# Patient Record
Sex: MALE | Race: WHITE | NOT HISPANIC OR LATINO | Employment: FULL TIME | URBAN - NONMETROPOLITAN AREA
[De-identification: names, ages, dates, MRNs, and addresses within clinical notes are randomized per-mention and may not be internally consistent; named-entity substitution may affect disease eponyms.]

---

## 2022-05-04 ENCOUNTER — TELEPHONE (OUTPATIENT)
Dept: FAMILY MEDICINE CLINIC | Facility: CLINIC | Age: 32
End: 2022-05-04

## 2022-05-04 NOTE — TELEPHONE ENCOUNTER
Incoming Refill Request      Medication requested (name and dose):     ATORVASTATIN 20MG TABLET    [facility]    Additional details provided by patient:     NONE    Best call back number:     [phone]    Does the patient have less than a 3 day supply:  [] Yes  [x] No    Iram Benítez  05/04/22, 14:17 EDT

## 2022-05-06 RX ORDER — ATORVASTATIN CALCIUM 20 MG/1
TABLET, FILM COATED ORAL
COMMUNITY
Start: 2022-03-31 | End: 2022-05-06 | Stop reason: SDUPTHER

## 2022-05-06 RX ORDER — OMEPRAZOLE 40 MG/1
40 CAPSULE, DELAYED RELEASE ORAL DAILY
COMMUNITY
Start: 2022-02-16 | End: 2022-08-09 | Stop reason: SDUPTHER

## 2022-05-06 RX ORDER — PAROXETINE 10 MG/1
10 TABLET, FILM COATED ORAL EVERY MORNING
COMMUNITY
Start: 2022-02-16 | End: 2022-08-09 | Stop reason: SDUPTHER

## 2022-05-06 RX ORDER — ATORVASTATIN CALCIUM 20 MG/1
20 TABLET, FILM COATED ORAL DAILY
Qty: 90 TABLET | Refills: 1 | Status: SHIPPED | OUTPATIENT
Start: 2022-05-06 | End: 2022-11-03

## 2022-05-06 RX ORDER — RAMIPRIL 5 MG/1
5 CAPSULE ORAL DAILY
COMMUNITY
Start: 2022-02-16 | End: 2022-08-09 | Stop reason: SDUPTHER

## 2022-08-09 ENCOUNTER — OFFICE VISIT (OUTPATIENT)
Dept: FAMILY MEDICINE CLINIC | Facility: CLINIC | Age: 32
End: 2022-08-09

## 2022-08-09 VITALS
DIASTOLIC BLOOD PRESSURE: 82 MMHG | SYSTOLIC BLOOD PRESSURE: 130 MMHG | BODY MASS INDEX: 25.97 KG/M2 | HEIGHT: 70 IN | OXYGEN SATURATION: 96 % | TEMPERATURE: 97.1 F | HEART RATE: 78 BPM | WEIGHT: 181.4 LBS

## 2022-08-09 DIAGNOSIS — Z00.00 ENCOUNTER FOR WELLNESS EXAMINATION IN ADULT: Primary | ICD-10-CM

## 2022-08-09 DIAGNOSIS — E78.2 MIXED HYPERLIPIDEMIA: ICD-10-CM

## 2022-08-09 DIAGNOSIS — E66.3 OVERWEIGHT: ICD-10-CM

## 2022-08-09 DIAGNOSIS — F41.1 GAD (GENERALIZED ANXIETY DISORDER): ICD-10-CM

## 2022-08-09 DIAGNOSIS — I10 PRIMARY HYPERTENSION: ICD-10-CM

## 2022-08-09 DIAGNOSIS — K21.9 GASTROESOPHAGEAL REFLUX DISEASE, UNSPECIFIED WHETHER ESOPHAGITIS PRESENT: ICD-10-CM

## 2022-08-09 PROCEDURE — 99385 PREV VISIT NEW AGE 18-39: CPT | Performed by: FAMILY MEDICINE

## 2022-08-09 PROCEDURE — 36415 COLL VENOUS BLD VENIPUNCTURE: CPT | Performed by: FAMILY MEDICINE

## 2022-08-09 RX ORDER — OMEPRAZOLE 40 MG/1
40 CAPSULE, DELAYED RELEASE ORAL DAILY
Qty: 90 CAPSULE | Refills: 1 | Status: SHIPPED | OUTPATIENT
Start: 2022-08-09

## 2022-08-09 RX ORDER — RAMIPRIL 5 MG/1
5 CAPSULE ORAL DAILY
Qty: 90 CAPSULE | Refills: 1 | Status: SHIPPED | OUTPATIENT
Start: 2022-08-09

## 2022-08-09 RX ORDER — PAROXETINE 10 MG/1
10 TABLET, FILM COATED ORAL EVERY MORNING
Qty: 90 TABLET | Refills: 1 | Status: SHIPPED | OUTPATIENT
Start: 2022-08-09

## 2022-08-09 NOTE — PROGRESS NOTES
Patient Name: Andres Corral  : 1990   MRN: [id number]     Chief Complaint:    Chief Complaint   Patient presents with   • Annual Exam       History of Present Illness: Andres Corral is a 32 y.o. male who is here today for their annual health maintenance and physical. Patient presents for follow-up on chronic medical problems including hypertension, hyperlipidemia and GERD. Patient denies adverse effects of medications, headache, dizziness, chest pain, palpitations, shortness of breath and cough. Patient complains of no significant issue. Patient is here for monitoring of chronic issues and fasting lab work.  He has family history of father with type 2 diabetes.  He denies family history of colon or prostate cancer.            Review of Systems:   Review of Systems   Constitutional: Negative for fatigue.   Eyes: Negative for blurred vision.   Respiratory: Negative for cough, chest tightness and shortness of breath.    Cardiovascular: Negative for chest pain, palpitations and leg swelling.   Gastrointestinal: Negative for abdominal pain, constipation, diarrhea, nausea and vomiting.   Neurological: Negative for dizziness, tremors and headache.   Psychiatric/Behavioral: Negative for depressed mood. The patient is not nervous/anxious.        Past Medical History, Social History, Family History and Care Team were all reviewed with patient and updated as appropriate.     Medications:     Current Outpatient Medications:   •  atorvastatin (LIPITOR) 20 MG tablet, Take 1 tablet by mouth Daily., Disp: 90 tablet, Rfl: 1  •  omeprazole (priLOSEC) 40 MG capsule, Take 1 capsule by mouth Daily., Disp: 90 capsule, Rfl: 1  •  PARoxetine (PAXIL) 10 MG tablet, Take 1 tablet by mouth Every Morning., Disp: 90 tablet, Rfl: 1  •  ramipril (ALTACE) 5 MG capsule, Take 1 capsule by mouth Daily., Disp: 90 capsule, Rfl: 1    Allergies:   No Known Allergies        PHQ-2 Total Score: 0   PHQ-9 Total Score: 0      Intimate partner  "violence: (Screen on initial visit, older adult with injury or evidence of neglect):  • Violence can be a problem in many people's lives, so I now ask every patient about trauma or abuse they may have experienced in a relationship.  • Stress/Safety - Do you feel safe in your relationship?  • Afraid/Abused - Have you ever been in a relationship where you were threatened, hurt, or afraid?  • Friend/Family - Are your friends aware you have been hurt?  • Emergency Plan - Do you have a safe place to go and the resources you need in an emergency?    Osteoporosis:   • Men: history of low trauma fracture, androgen deprivation therapy for prostate cancer, hypogonadism, primary hyperparathyroidism, intestinal disorders.       Physical Exam:  Vital Signs:   Vitals:    08/09/22 0752 08/09/22 0810   BP: 142/100 130/82   BP Location: Left arm    Patient Position: Sitting    Cuff Size: Adult    Pulse: 78    Temp: 97.1 °F (36.2 °C)    TempSrc: Temporal    SpO2: 96%    Weight: 82.3 kg (181 lb 6.4 oz)    Height: 177.8 cm (70\")      Body mass index is 26.03 kg/m².     Physical Exam  Vitals and nursing note reviewed.   Constitutional:       Appearance: Normal appearance.   HENT:      Head: Normocephalic and atraumatic.      Right Ear: Tympanic membrane and ear canal normal.      Left Ear: Tympanic membrane and ear canal normal.      Nose: Nose normal.      Mouth/Throat:      Mouth: Mucous membranes are moist.      Pharynx: Oropharynx is clear.   Eyes:      Conjunctiva/sclera: Conjunctivae normal.      Pupils: Pupils are equal, round, and reactive to light.   Cardiovascular:      Rate and Rhythm: Normal rate and regular rhythm.      Heart sounds: Normal heart sounds. No murmur heard.  Pulmonary:      Effort: Pulmonary effort is normal.      Breath sounds: Normal breath sounds. No wheezing, rhonchi or rales.   Abdominal:      General: Bowel sounds are normal.      Palpations: Abdomen is soft.      Tenderness: There is no abdominal " tenderness.   Musculoskeletal:         General: Normal range of motion.      Cervical back: Normal range of motion and neck supple.      Right lower leg: No edema.      Left lower leg: No edema.   Lymphadenopathy:      Cervical: No cervical adenopathy.   Skin:     General: Skin is warm.      Findings: No rash.   Neurological:      General: No focal deficit present.      Mental Status: He is alert and oriented to person, place, and time.      Motor: No weakness.   Psychiatric:         Mood and Affect: Mood normal.         Behavior: Behavior normal.         Procedures      Assessment/Plan:   Diagnoses and all orders for this visit:    1. Encounter for wellness examination in adult (Primary)  Assessment & Plan:  Fasting labs today, patient is up-to-date on health maintenance    Orders:  -     Hemoglobin A1c; Future  -     CBC Auto Differential; Future  -     Comprehensive Metabolic Panel; Future  -     Lipid Panel; Future  -     TSH; Future    2. Primary hypertension  Assessment & Plan:  Hypertension is unchanged.  Continue current treatment regimen.  Regular aerobic exercise.  Blood pressure will be reassessed at the next regular appointment.    Orders:  -     ramipril (ALTACE) 5 MG capsule; Take 1 capsule by mouth Daily.  Dispense: 90 capsule; Refill: 1    3. Mixed hyperlipidemia  Assessment & Plan:  Lipid abnormalities are improving with treatment.  Nutritional counseling was provided. and Pharmacotherapy as ordered.  Lipids will be reassessed in 6 months.      4. Gastroesophageal reflux disease, unspecified whether esophagitis present  Assessment & Plan:  Stable on PPI, refilled    Orders:  -     omeprazole (priLOSEC) 40 MG capsule; Take 1 capsule by mouth Daily.  Dispense: 90 capsule; Refill: 1    5. RAFAEL (generalized anxiety disorder)  Assessment & Plan:  Psychological condition is Stable.  Continue current treatment regimen.  Psychological condition  will be reassessed at the next regular  appointment.    Orders:  -     PARoxetine (PAXIL) 10 MG tablet; Take 1 tablet by mouth Every Morning.  Dispense: 90 tablet; Refill: 1    6. Overweight  Assessment & Plan:  Patient's (Body mass index is 26.03 kg/m².) indicates that they are overweight with health conditions that include hypertension, dyslipidemias and GERD . Weight is unchanged. BMI is is above average; BMI management plan is completed. We discussed portion control and increasing exercise.            Follow Up:   Return in about 6 months (around 2/9/2023) for Med Recheck.    Healthcare Maintenance:   Counseling provided on Discussed injury prevention, diet and exercise, safe sexual practices, and screening for common diseases. Encouraged use of sunscreen and seatbelts. Discussed timing of colon cancer cancer screening, prostate cancer screening, and review of skin for lesions. Avoidance of tobacco encouraged. Limitation or avoidance of alcohol encouraged. Recommend yearly dental and eye exams. Also discussed monitoring of blood pressure and lipids.   Andres Corral voices understanding and acceptance of this advice and will call back with any further questions or concerns. AVS with preventive healthcare tips printed for patient.     Heather Go, DO  [facility] [place] [facility]

## 2022-08-09 NOTE — ASSESSMENT & PLAN NOTE
Patient's (Body mass index is 26.03 kg/m².) indicates that they are overweight with health conditions that include hypertension, dyslipidemias and GERD . Weight is unchanged. BMI is is above average; BMI management plan is completed. We discussed portion control and increasing exercise.

## 2022-08-09 NOTE — ASSESSMENT & PLAN NOTE
Psychological condition is Stable.  Continue current treatment regimen.  Psychological condition  will be reassessed at the next regular appointment.

## 2022-08-10 LAB
ALBUMIN SERPL-MCNC: 5.2 G/DL (ref 4–5)
ALBUMIN/GLOB SERPL: 2.5 {RATIO} (ref 1.2–2.2)
ALP SERPL-CCNC: 98 IU/L (ref 44–121)
ALT SERPL-CCNC: 24 IU/L (ref 0–44)
AST SERPL-CCNC: 15 IU/L (ref 0–40)
BASOPHILS # BLD AUTO: 0 X10E3/UL (ref 0–0.2)
BASOPHILS NFR BLD AUTO: 0 %
BILIRUB SERPL-MCNC: 0.8 MG/DL (ref 0–1.2)
BUN SERPL-MCNC: 18 MG/DL (ref 6–20)
BUN/CREAT SERPL: 18 (ref 9–20)
CALCIUM SERPL-MCNC: 9.6 MG/DL (ref 8.7–10.2)
CHLORIDE SERPL-SCNC: 100 MMOL/L (ref 96–106)
CHOLEST SERPL-MCNC: 160 MG/DL (ref 100–199)
CO2 SERPL-SCNC: 25 MMOL/L (ref 20–29)
CREAT SERPL-MCNC: 0.99 MG/DL (ref 0.76–1.27)
EGFRCR SERPLBLD CKD-EPI 2021: 103 ML/MIN/1.73
EOSINOPHIL # BLD AUTO: 0.1 X10E3/UL (ref 0–0.4)
EOSINOPHIL NFR BLD AUTO: 2 %
ERYTHROCYTE [DISTWIDTH] IN BLOOD BY AUTOMATED COUNT: 12.6 % (ref 11.6–15.4)
GLOBULIN SER CALC-MCNC: 2.1 G/DL (ref 1.5–4.5)
GLUCOSE SERPL-MCNC: 98 MG/DL (ref 65–99)
HBA1C MFR BLD: 5.3 % (ref 4.8–5.6)
HCT VFR BLD AUTO: 47 % (ref 37.5–51)
HDLC SERPL-MCNC: 41 MG/DL
HGB BLD-MCNC: 15.3 G/DL (ref 13–17.7)
IMM GRANULOCYTES # BLD AUTO: 0 X10E3/UL (ref 0–0.1)
IMM GRANULOCYTES NFR BLD AUTO: 0 %
LDLC SERPL CALC-MCNC: 87 MG/DL (ref 0–99)
LYMPHOCYTES # BLD AUTO: 1.9 X10E3/UL (ref 0.7–3.1)
LYMPHOCYTES NFR BLD AUTO: 35 %
MCH RBC QN AUTO: 29.7 PG (ref 26.6–33)
MCHC RBC AUTO-ENTMCNC: 32.6 G/DL (ref 31.5–35.7)
MCV RBC AUTO: 91 FL (ref 79–97)
MONOCYTES # BLD AUTO: 0.4 X10E3/UL (ref 0.1–0.9)
MONOCYTES NFR BLD AUTO: 7 %
NEUTROPHILS # BLD AUTO: 3.1 X10E3/UL (ref 1.4–7)
NEUTROPHILS NFR BLD AUTO: 56 %
PLATELET # BLD AUTO: 199 X10E3/UL (ref 150–450)
POTASSIUM SERPL-SCNC: 4.7 MMOL/L (ref 3.5–5.2)
PROT SERPL-MCNC: 7.3 G/DL (ref 6–8.5)
RBC # BLD AUTO: 5.16 X10E6/UL (ref 4.14–5.8)
SODIUM SERPL-SCNC: 139 MMOL/L (ref 134–144)
TRIGL SERPL-MCNC: 187 MG/DL (ref 0–149)
TSH SERPL DL<=0.005 MIU/L-ACNC: 2.24 UIU/ML (ref 0.45–4.5)
VLDLC SERPL CALC-MCNC: 32 MG/DL (ref 5–40)
WBC # BLD AUTO: 5.5 X10E3/UL (ref 3.4–10.8)

## 2022-11-03 RX ORDER — ATORVASTATIN CALCIUM 20 MG/1
TABLET, FILM COATED ORAL
Qty: 90 TABLET | Refills: 1 | Status: SHIPPED | OUTPATIENT
Start: 2022-11-03

## 2023-02-09 ENCOUNTER — OFFICE VISIT (OUTPATIENT)
Dept: FAMILY MEDICINE CLINIC | Facility: CLINIC | Age: 33
End: 2023-02-09
Payer: COMMERCIAL

## 2023-02-09 VITALS
BODY MASS INDEX: 26.2 KG/M2 | HEART RATE: 93 BPM | SYSTOLIC BLOOD PRESSURE: 138 MMHG | WEIGHT: 183 LBS | OXYGEN SATURATION: 98 % | DIASTOLIC BLOOD PRESSURE: 78 MMHG | HEIGHT: 70 IN

## 2023-02-09 DIAGNOSIS — E78.2 MIXED HYPERLIPIDEMIA: ICD-10-CM

## 2023-02-09 DIAGNOSIS — E66.3 OVERWEIGHT: ICD-10-CM

## 2023-02-09 DIAGNOSIS — I10 PRIMARY HYPERTENSION: Primary | ICD-10-CM

## 2023-02-09 DIAGNOSIS — K21.9 GASTROESOPHAGEAL REFLUX DISEASE, UNSPECIFIED WHETHER ESOPHAGITIS PRESENT: ICD-10-CM

## 2023-02-09 PROCEDURE — 99215 OFFICE O/P EST HI 40 MIN: CPT | Performed by: FAMILY MEDICINE

## 2023-02-09 RX ORDER — FLUTICASONE PROPIONATE 0.05 %
1 CREAM (GRAM) TOPICAL 2 TIMES DAILY
Qty: 60 G | Refills: 5 | Status: SHIPPED | OUTPATIENT
Start: 2023-02-09

## 2023-02-09 RX ORDER — FAMOTIDINE 40 MG/1
40 TABLET, FILM COATED ORAL DAILY
Qty: 90 TABLET | Refills: 1 | Status: SHIPPED | OUTPATIENT
Start: 2023-02-09

## 2023-02-09 NOTE — ASSESSMENT & PLAN NOTE
Patient's (Body mass index is 26.26 kg/m².) indicates that they are overweight with health conditions that include hypertension, dyslipidemias and GERD . Weight is unchanged. BMI is is above average; BMI management plan is completed. We discussed portion control and increasing exercise.

## 2023-02-09 NOTE — ASSESSMENT & PLAN NOTE
Hypertension is unchanged.  Continue current treatment regimen.  Weight loss.  Regular aerobic exercise.  Blood pressure will be reassessed at the next regular appointment.    I spent 40 minutes with the patient discussing health anxiety, questions and concerns about potential health issues, ordering blood work, discussing current treatment regimen, and on documentation.

## 2023-02-09 NOTE — PROGRESS NOTES
Date: 2023   Patient Name: Andres Corral  : 1990   MRN: [id number]     Chief Complaint:    Chief Complaint   Patient presents with   • Heartburn     Noticed about a week an a half ago    • Follow-up     Patient fasting if bloodwork needed       History of Present Illness: Andres Corral is a 32 y.o. male who is here today to follow up for Hypertension, hyperlipidemia, and GERD.  He states that something he ate recently caused a flareup of his acid reflux and he has been having some epigastric pain and uncontrolled reflux even while taking his omeprazole 40 mg daily.  He has questions and concerns about long-term PPI use.    He is due for fasting lab work today.  Blood pressure remains well controlled on current regimen and he is tolerating his statin well.  Labs were well controlled about 6 months ago.           Review of Systems:   Review of Systems   Constitutional: Negative for fatigue.   Eyes: Negative for blurred vision.   Respiratory: Negative for cough, chest tightness and shortness of breath.    Cardiovascular: Negative for chest pain, palpitations and leg swelling.   Gastrointestinal: Positive for GERD. Negative for abdominal pain, constipation, diarrhea, nausea and vomiting.   Neurological: Negative for dizziness, tremors and headache.   Psychiatric/Behavioral: Negative for depressed mood. The patient is nervous/anxious.        Past Medical History:   Past Medical History:   Diagnosis Date   • Anxiety    • Gastroesophageal reflux disease 2022   • GERD (gastroesophageal reflux disease)    • Hyperlipidemia    • Hypertension    • Mixed hyperlipidemia 2022   • Primary hypertension 2022       Past Surgical History:   Past Surgical History:   Procedure Laterality Date   • WISDOM TOOTH EXTRACTION         Family History:   Family History   Problem Relation Age of Onset   • Diabetes Father    • Hypertension Father        Social History:   Social History     Socioeconomic History   •  "Marital status:    Tobacco Use   • Smoking status: Never   • Smokeless tobacco: Never   Vaping Use   • Vaping Use: Never used   Substance and Sexual Activity   • Alcohol use: Yes     Comment: Rarely   • Drug use: Never   • Sexual activity: Yes     Partners: Female       Medications:     Current Outpatient Medications:   •  atorvastatin (LIPITOR) 20 MG tablet, TAKE ONE TABLET BY MOUTH DAILY, Disp: 90 tablet, Rfl: 1  •  omeprazole (priLOSEC) 40 MG capsule, Take 1 capsule by mouth Daily., Disp: 90 capsule, Rfl: 1  •  PARoxetine (PAXIL) 10 MG tablet, Take 1 tablet by mouth Every Morning., Disp: 90 tablet, Rfl: 1  •  ramipril (ALTACE) 5 MG capsule, Take 1 capsule by mouth Daily., Disp: 90 capsule, Rfl: 1  •  famotidine (PEPCID) 40 MG tablet, Take 1 tablet by mouth Daily., Disp: 90 tablet, Rfl: 1  •  fluticasone (CUTIVATE) 0.05 % cream, Apply 1 application topically to the appropriate area as directed 2 (Two) Times a Day., Disp: 60 g, Rfl: 5    Allergies:   No Known Allergies    PHQ-2 Total Score: 0   PHQ-9 Total Score: 0     Physical Exam:  Vital Signs:   Vitals:    02/09/23 0809   BP: 138/78   Pulse: 93   SpO2: 98%   Weight: 83 kg (183 lb)   Height: 177.8 cm (70\")     Body mass index is 26.26 kg/m².     Physical Exam  Vitals and nursing note reviewed.   Constitutional:       Appearance: Normal appearance.   HENT:      Head: Normocephalic and atraumatic.   Cardiovascular:      Rate and Rhythm: Normal rate and regular rhythm.      Heart sounds: Normal heart sounds. No murmur heard.  Pulmonary:      Effort: Pulmonary effort is normal.      Breath sounds: Normal breath sounds. No wheezing.   Abdominal:      General: Bowel sounds are normal.      Palpations: Abdomen is soft.   Neurological:      Mental Status: He is alert and oriented to person, place, and time. Mental status is at baseline.   Psychiatric:         Mood and Affect: Mood normal.         Behavior: Behavior normal.           Assessment/Plan:   Diagnoses " and all orders for this visit:    1. Primary hypertension (Primary)  Assessment & Plan:  Hypertension is unchanged.  Continue current treatment regimen.  Weight loss.  Regular aerobic exercise.  Blood pressure will be reassessed at the next regular appointment.    I spent 40 minutes with the patient discussing health anxiety, questions and concerns about potential health issues, ordering blood work, discussing current treatment regimen, and on documentation.    Orders:  -     Comprehensive Metabolic Panel; Future  -     Lipid Panel; Future  -     Comprehensive Metabolic Panel  -     Lipid Panel    2. Mixed hyperlipidemia  Assessment & Plan:  Lipid abnormalities are unchanged.  Nutritional counseling was provided. and Pharmacotherapy as ordered.  Lipids will be reassessed in 6 months.    Orders:  -     CBC Auto Differential; Future  -     CBC Auto Differential    3. Gastroesophageal reflux disease, unspecified whether esophagitis present  Assessment & Plan:  Patient will add famotidine to omeprazole for 2 weeks.  He may then try to discontinue omeprazole and just take famotidine.  We discussed potential long-term side effects of chronic PPI use.    Orders:  -     famotidine (PEPCID) 40 MG tablet; Take 1 tablet by mouth Daily.  Dispense: 90 tablet; Refill: 1    4. Overweight  Assessment & Plan:  Patient's (Body mass index is 26.26 kg/m².) indicates that they are overweight with health conditions that include hypertension, dyslipidemias and GERD . Weight is unchanged. BMI is is above average; BMI management plan is completed. We discussed portion control and increasing exercise.       Other orders  -     fluticasone (CUTIVATE) 0.05 % cream; Apply 1 application topically to the appropriate area as directed 2 (Two) Times a Day.  Dispense: 60 g; Refill: 5         Follow Up:   Return in about 6 months (around 8/9/2023) for Annual physical.    Heather Go, DO  [facility] [place] [facility]

## 2023-02-09 NOTE — ASSESSMENT & PLAN NOTE
Patient will add famotidine to omeprazole for 2 weeks.  He may then try to discontinue omeprazole and just take famotidine.  We discussed potential long-term side effects of chronic PPI use.

## 2023-02-10 LAB
ALBUMIN SERPL-MCNC: 5.2 G/DL (ref 4–5)
ALBUMIN/GLOB SERPL: 2.4 {RATIO} (ref 1.2–2.2)
ALP SERPL-CCNC: 94 IU/L (ref 44–121)
ALT SERPL-CCNC: 34 IU/L (ref 0–44)
AST SERPL-CCNC: 19 IU/L (ref 0–40)
BASOPHILS # BLD AUTO: 0 X10E3/UL (ref 0–0.2)
BASOPHILS NFR BLD AUTO: 0 %
BILIRUB SERPL-MCNC: 0.7 MG/DL (ref 0–1.2)
BUN SERPL-MCNC: 19 MG/DL (ref 6–20)
BUN/CREAT SERPL: 18 (ref 9–20)
CALCIUM SERPL-MCNC: 9.9 MG/DL (ref 8.7–10.2)
CHLORIDE SERPL-SCNC: 101 MMOL/L (ref 96–106)
CHOLEST SERPL-MCNC: 169 MG/DL (ref 100–199)
CO2 SERPL-SCNC: 24 MMOL/L (ref 20–29)
CREAT SERPL-MCNC: 1.03 MG/DL (ref 0.76–1.27)
EGFRCR SERPLBLD CKD-EPI 2021: 98 ML/MIN/1.73
EOSINOPHIL # BLD AUTO: 0.1 X10E3/UL (ref 0–0.4)
EOSINOPHIL NFR BLD AUTO: 1 %
ERYTHROCYTE [DISTWIDTH] IN BLOOD BY AUTOMATED COUNT: 12.2 % (ref 11.6–15.4)
GLOBULIN SER CALC-MCNC: 2.2 G/DL (ref 1.5–4.5)
GLUCOSE SERPL-MCNC: 97 MG/DL (ref 70–99)
HCT VFR BLD AUTO: 46.5 % (ref 37.5–51)
HDLC SERPL-MCNC: 38 MG/DL
HGB BLD-MCNC: 15.6 G/DL (ref 13–17.7)
IMM GRANULOCYTES # BLD AUTO: 0 X10E3/UL (ref 0–0.1)
IMM GRANULOCYTES NFR BLD AUTO: 0 %
LDLC SERPL CALC-MCNC: 85 MG/DL (ref 0–99)
LYMPHOCYTES # BLD AUTO: 1.7 X10E3/UL (ref 0.7–3.1)
LYMPHOCYTES NFR BLD AUTO: 29 %
MCH RBC QN AUTO: 29.8 PG (ref 26.6–33)
MCHC RBC AUTO-ENTMCNC: 33.5 G/DL (ref 31.5–35.7)
MCV RBC AUTO: 89 FL (ref 79–97)
MONOCYTES # BLD AUTO: 0.4 X10E3/UL (ref 0.1–0.9)
MONOCYTES NFR BLD AUTO: 7 %
NEUTROPHILS # BLD AUTO: 3.5 X10E3/UL (ref 1.4–7)
NEUTROPHILS NFR BLD AUTO: 63 %
PLATELET # BLD AUTO: 241 X10E3/UL (ref 150–450)
POTASSIUM SERPL-SCNC: 4.7 MMOL/L (ref 3.5–5.2)
PROT SERPL-MCNC: 7.4 G/DL (ref 6–8.5)
RBC # BLD AUTO: 5.24 X10E6/UL (ref 4.14–5.8)
SODIUM SERPL-SCNC: 141 MMOL/L (ref 134–144)
TRIGL SERPL-MCNC: 281 MG/DL (ref 0–149)
VLDLC SERPL CALC-MCNC: 46 MG/DL (ref 5–40)
WBC # BLD AUTO: 5.7 X10E3/UL (ref 3.4–10.8)

## 2023-03-23 DIAGNOSIS — R09.81 SINUS CONGESTION: ICD-10-CM

## 2023-03-23 DIAGNOSIS — J01.00 ACUTE NON-RECURRENT MAXILLARY SINUSITIS: Primary | ICD-10-CM

## 2023-03-23 RX ORDER — METHYLPREDNISOLONE 4 MG/1
TABLET ORAL
Qty: 21 TABLET | Refills: 0 | Status: SHIPPED | OUTPATIENT
Start: 2023-03-23

## 2023-05-06 RX ORDER — ATORVASTATIN CALCIUM 20 MG/1
TABLET, FILM COATED ORAL
Qty: 90 TABLET | Refills: 1 | Status: SHIPPED | OUTPATIENT
Start: 2023-05-06

## 2023-05-10 DIAGNOSIS — K62.5 RECTAL BLEEDING: Primary | ICD-10-CM

## 2023-05-20 DIAGNOSIS — F41.1 GAD (GENERALIZED ANXIETY DISORDER): ICD-10-CM

## 2023-05-20 DIAGNOSIS — K21.9 GASTROESOPHAGEAL REFLUX DISEASE, UNSPECIFIED WHETHER ESOPHAGITIS PRESENT: ICD-10-CM

## 2023-05-20 DIAGNOSIS — I10 PRIMARY HYPERTENSION: ICD-10-CM

## 2023-05-22 DIAGNOSIS — F41.1 GAD (GENERALIZED ANXIETY DISORDER): ICD-10-CM

## 2023-05-22 RX ORDER — OMEPRAZOLE 40 MG/1
CAPSULE, DELAYED RELEASE ORAL
Qty: 90 CAPSULE | Refills: 1 | Status: SHIPPED | OUTPATIENT
Start: 2023-05-22

## 2023-05-22 RX ORDER — RAMIPRIL 5 MG/1
CAPSULE ORAL
Qty: 90 CAPSULE | Refills: 1 | Status: SHIPPED | OUTPATIENT
Start: 2023-05-22

## 2023-05-22 RX ORDER — PAROXETINE 10 MG/1
TABLET, FILM COATED ORAL
Qty: 90 TABLET | Refills: 1 | Status: SHIPPED | OUTPATIENT
Start: 2023-05-22 | End: 2023-05-22 | Stop reason: SDUPTHER

## 2023-05-23 RX ORDER — PAROXETINE 10 MG/1
10 TABLET, FILM COATED ORAL EVERY MORNING
Qty: 90 TABLET | Refills: 1 | Status: SHIPPED | OUTPATIENT
Start: 2023-05-23

## 2023-08-02 DIAGNOSIS — Z00.00 HEALTHCARE MAINTENANCE: ICD-10-CM

## 2023-08-02 DIAGNOSIS — Z11.59 NEED FOR HEPATITIS C SCREENING TEST: Primary | ICD-10-CM

## 2023-08-02 DIAGNOSIS — I10 PRIMARY HYPERTENSION: ICD-10-CM

## 2023-08-02 DIAGNOSIS — E78.2 MIXED HYPERLIPIDEMIA: ICD-10-CM

## 2023-08-05 DIAGNOSIS — K21.9 GASTROESOPHAGEAL REFLUX DISEASE, UNSPECIFIED WHETHER ESOPHAGITIS PRESENT: ICD-10-CM

## 2023-08-07 RX ORDER — FAMOTIDINE 40 MG/1
TABLET, FILM COATED ORAL
Qty: 90 TABLET | Refills: 1 | Status: SHIPPED | OUTPATIENT
Start: 2023-08-07

## 2023-08-08 ENCOUNTER — LAB (OUTPATIENT)
Dept: FAMILY MEDICINE CLINIC | Facility: CLINIC | Age: 33
End: 2023-08-08
Payer: COMMERCIAL

## 2023-08-08 DIAGNOSIS — I10 PRIMARY HYPERTENSION: ICD-10-CM

## 2023-08-08 DIAGNOSIS — E78.2 MIXED HYPERLIPIDEMIA: ICD-10-CM

## 2023-08-08 DIAGNOSIS — Z11.59 NEED FOR HEPATITIS C SCREENING TEST: ICD-10-CM

## 2023-08-08 DIAGNOSIS — Z00.00 HEALTHCARE MAINTENANCE: ICD-10-CM

## 2023-08-08 PROCEDURE — 36415 COLL VENOUS BLD VENIPUNCTURE: CPT | Performed by: NURSE PRACTITIONER

## 2023-08-09 LAB
ALBUMIN SERPL-MCNC: 5 G/DL (ref 4.1–5.1)
ALBUMIN/GLOB SERPL: 2.2 {RATIO} (ref 1.2–2.2)
ALP SERPL-CCNC: 85 IU/L (ref 44–121)
ALT SERPL-CCNC: 25 IU/L (ref 0–44)
AST SERPL-CCNC: 17 IU/L (ref 0–40)
BILIRUB SERPL-MCNC: 0.9 MG/DL (ref 0–1.2)
BUN SERPL-MCNC: 15 MG/DL (ref 6–20)
BUN/CREAT SERPL: 14 (ref 9–20)
CALCIUM SERPL-MCNC: 9.6 MG/DL (ref 8.7–10.2)
CHLORIDE SERPL-SCNC: 100 MMOL/L (ref 96–106)
CHOLEST SERPL-MCNC: 127 MG/DL (ref 100–199)
CO2 SERPL-SCNC: 24 MMOL/L (ref 20–29)
CREAT SERPL-MCNC: 1.06 MG/DL (ref 0.76–1.27)
EGFRCR SERPLBLD CKD-EPI 2021: 94 ML/MIN/1.73
ERYTHROCYTE [DISTWIDTH] IN BLOOD BY AUTOMATED COUNT: 12.2 % (ref 11.6–15.4)
GLOBULIN SER CALC-MCNC: 2.3 G/DL (ref 1.5–4.5)
GLUCOSE SERPL-MCNC: 97 MG/DL (ref 70–99)
HBA1C MFR BLD: 5.2 % (ref 4.8–5.6)
HCT VFR BLD AUTO: 43.9 % (ref 37.5–51)
HCV IGG SERPL QL IA: NON REACTIVE
HDLC SERPL-MCNC: 39 MG/DL
HGB BLD-MCNC: 14.8 G/DL (ref 13–17.7)
LDLC SERPL CALC-MCNC: 67 MG/DL (ref 0–99)
MCH RBC QN AUTO: 29.9 PG (ref 26.6–33)
MCHC RBC AUTO-ENTMCNC: 33.7 G/DL (ref 31.5–35.7)
MCV RBC AUTO: 89 FL (ref 79–97)
PLATELET # BLD AUTO: 194 X10E3/UL (ref 150–450)
POTASSIUM SERPL-SCNC: 4.4 MMOL/L (ref 3.5–5.2)
PROT SERPL-MCNC: 7.3 G/DL (ref 6–8.5)
RBC # BLD AUTO: 4.95 X10E6/UL (ref 4.14–5.8)
SODIUM SERPL-SCNC: 140 MMOL/L (ref 134–144)
TRIGL SERPL-MCNC: 112 MG/DL (ref 0–149)
TSH SERPL DL<=0.005 MIU/L-ACNC: 2.39 UIU/ML (ref 0.45–4.5)
VLDLC SERPL CALC-MCNC: 21 MG/DL (ref 5–40)
WBC # BLD AUTO: 5.2 X10E3/UL (ref 3.4–10.8)

## 2023-08-10 ENCOUNTER — OFFICE VISIT (OUTPATIENT)
Dept: INTERNAL MEDICINE | Facility: CLINIC | Age: 33
End: 2023-08-10
Payer: COMMERCIAL

## 2023-08-10 VITALS
HEIGHT: 70 IN | DIASTOLIC BLOOD PRESSURE: 80 MMHG | OXYGEN SATURATION: 98 % | TEMPERATURE: 97.1 F | BODY MASS INDEX: 24.34 KG/M2 | WEIGHT: 170 LBS | SYSTOLIC BLOOD PRESSURE: 116 MMHG | RESPIRATION RATE: 16 BRPM | HEART RATE: 69 BPM

## 2023-08-10 DIAGNOSIS — F41.1 GAD (GENERALIZED ANXIETY DISORDER): ICD-10-CM

## 2023-08-10 DIAGNOSIS — K62.5 RECTAL BLEEDING: ICD-10-CM

## 2023-08-10 DIAGNOSIS — K21.9 GASTROESOPHAGEAL REFLUX DISEASE, UNSPECIFIED WHETHER ESOPHAGITIS PRESENT: ICD-10-CM

## 2023-08-10 DIAGNOSIS — I10 PRIMARY HYPERTENSION: ICD-10-CM

## 2023-08-10 DIAGNOSIS — R10.9 ABDOMINAL PAIN, UNSPECIFIED ABDOMINAL LOCATION: ICD-10-CM

## 2023-08-10 DIAGNOSIS — R19.4 BOWEL HABIT CHANGES: ICD-10-CM

## 2023-08-10 DIAGNOSIS — E78.2 MIXED HYPERLIPIDEMIA: ICD-10-CM

## 2023-08-10 DIAGNOSIS — Z00.00 ANNUAL PHYSICAL EXAM: Primary | ICD-10-CM

## 2023-08-10 NOTE — PROGRESS NOTES
Male Physical Note      Date: 08/10/2023   Patient Name: Andres Corral  : 1990   MRN: [id number]     Chief Complaint   Patient presents with    Annual Exam, f/u acute GI sxs         History of Present Illness:  Andres Corral is a 33 y.o. male who is here for his annual health maintenance exam.  The last health maintenance visit was 1 year(s) ago.    Care Team:  Patient Care Team:  Kaelyn Soares APRN as PCP - General (Family Medicine)  Nash Yung DO as Consulting Physician (Gastroenterology)    He has no acute complaints or concerns today.     General Health: He describes his health as good.  He has been doing well over the last year with no falls, hospitalizations, or ER visits. He has had no surgeries over the past year but has been dealing with some GI issues the past few months. Denies changes in family history.  He is up-to-date with eye and dental exams. He denies changes in vision and hearing. He describes his mood as good.  He is not up-to-date with immunizations.     Any notable personal or family history of cancer or CV disease?-- Maternal uncle with pacemaker.     Lifestyle: Household members include spouse and children. He is  with 2 children--ages 5y and 4m. Work status: working full time occupation state [employer] . He eats a diverse and healthy diet and exercises. Has made contious effort to improve diet such as eating less fried foods and making healthful food swabs such as fruit over fries when he does eat out. He does not have any weight concerns. He does not use tobacco products, vape/ e-cigarettes, or illicit drugs. Rare alcohol use. Caffeine use-- maybe 3 drinks/ week. He reports wearing his seat belt and for the most part avoids texting while driving. He wears sunscreen while outside on most occasions. He endorses working smoke detectors in the home. He does not have any sexual health concerns.     Pertinent chronic medical conditions are  as follows:  GI/ GERD- All GI sxs are improved. Currently taking 40mg omeprazole in the a.m., and famotidine in the p.m. scheduled to see Dr. Yung on 8/14/23. He prev saw Dr Yung several years ago with previous egd but no records are available. Was referred to GI specialist in [place] earlier this year as well-- Dr Engel. Continues to note occasional hard stools occurring several times/month precipitating hematochezia. Overall, his BMs are more regularly occurring with normal consistency than before. Denies eructation, epigastric pain, foul taste, cp, dysphagia/ odynophagia    HTN- Compliant with ramipril. BP consistently at goal.   Dyslipidemia-  Pt with continued lifestyle modifications and improved FLP today. Tolerating statin well.   RAFAEL- As above, GI and additionally anxiety sxs are improved. Paxil dose increased from 10 to 20mg last visit. Specifically he notes the unremarkable recent abd/ pelvic CT yielded significant calming effect on his nerves.     Subjective          Past Medical History:   Diagnosis Date    Anxiety     Gastroesophageal reflux disease 8/9/2022    GERD (gastroesophageal reflux disease)     Hyperlipidemia     Hypertension     Mixed hyperlipidemia 8/9/2022    Primary hypertension 8/9/2022     Past Surgical History:   Procedure Laterality Date    WISDOM TOOTH EXTRACTION       Family History   Problem Relation Age of Onset    Diabetes Father     Hypertension Father        Current Outpatient Medications:     atorvastatin (LIPITOR) 20 MG tablet, TAKE ONE TABLET BY MOUTH DAILY, Disp: 90 tablet, Rfl: 1    famotidine (PEPCID) 40 MG tablet, TAKE ONE TABLET BY MOUTH DAILY, Disp: 90 tablet, Rfl: 1    fluticasone (CUTIVATE) 0.05 % cream, Apply 1 application topically to the appropriate area as directed 2 (Two) Times a Day., Disp: 60 g, Rfl: 5    omeprazole (priLOSEC) 40 MG capsule, TAKE ONE CAPSULE BY MOUTH DAILY, Disp: 90 capsule, Rfl: 1    PARoxetine (PAXIL) 20 MG tablet, Take 1 tablet by mouth  Every Morning., Disp: 90 tablet, Rfl: 3    ramipril (ALTACE) 5 MG capsule, TAKE ONE CAPSULE BY MOUTH DAILY, Disp: 90 capsule, Rfl: 1  No Known Allergies  Immunization History   Administered Date(s) Administered    COVID-19 (PFIZER) Purple Cap Monovalent 08/25/2021, 09/15/2021    Flu Vaccine Quad PF 6-35MO 10/02/2017    Fluzone >6mos 09/30/2019    Hep B, Adolescent or Pediatric 12/27/2000, 01/29/2001, 06/15/2001    Hepatitis A 10/02/2018, 04/09/2019    Influenza, Unspecified 11/03/2021    MMR 12/27/2000    Meningococcal Conjugate 07/10/2008    Tdap 01/31/2017, 10/23/2017    flucelvax quad pfs =>4 YRS 10/01/2018     Health Maintenance Summary            Postponed - COVID-19 Vaccine (3 - Pfizer series) Postponed until 8/13/2025      09/15/2021  Imm Admin: COVID-19 (PFIZER) Purple Cap Monovalent    08/25/2021  Imm Admin: COVID-19 (PFIZER) Purple Cap Monovalent              INFLUENZA VACCINE (Yearly - October to March) Next due on 10/1/2023      11/03/2021  Imm Admin: Influenza, Unspecified    09/30/2019  Imm Admin: FluLaval/Fluzone >6mos    10/01/2018  Imm Admin: flucelvax quad pfs =>4 YRS    10/02/2017  Imm Admin: Flu Vaccine Quad PF 6-35MO              LIPID PANEL (Yearly) Next due on 8/8/2024 08/08/2023  Lipid Panel    02/09/2023  Lipid Panel    08/09/2022  Lipid Panel              ANNUAL PHYSICAL (Yearly) Next due on 8/10/2024      08/10/2023  Done    08/09/2022  Registry Metric: Last Annual Physical              TDAP/TD VACCINES (3 - Td or Tdap) Next due on 10/23/2027      10/23/2017  Imm Admin: Tdap    01/31/2017  Imm Admin: Tdap              HEPATITIS C SCREENING  Completed      08/08/2023  Hep C Virus Ab component of Hepatitis C Antibody              Pneumococcal Vaccine 0-64 (Series Information) Aged Out      No completion history exists for this topic.                        PHQ-2/PHQ-9 Depression Screening:  PHQ Total Score: 0      Intimate partner violence: (Screen on initial visit, pregnant women,  "women with injuries, older adult with injury or evidence of neglect):  Violence can be a problem in many people's lives, so I now ask every patient about trauma or abuse they may have experienced in a relationship.  Stress/Safety - Do you feel safe in your relationship?  Afraid/Abused - Have you ever been in a relationship where you were threatened, hurt, or afraid?  Friend/Family - Are your friends aware you have been hurt?  Emergency Plan - Do you have a safe place to go and the resources you need in an emergency?    Objective     Vitals:    08/10/23 0945   BP: 116/80   Pulse: 69   Resp: 16   Temp: 97.1 øF (36.2 øC)   SpO2: 98%   Weight: 77.1 kg (170 lb)   Height: 177.8 cm (70\")   PainSc: 0-No pain     Body mass index is 24.39 kg/mý.     Physical Exam  Vitals and nursing note reviewed.   Constitutional:       General: He is not in acute distress.     Appearance: Normal appearance. He is normal weight. He is not ill-appearing.   HENT:      Head: Normocephalic and atraumatic.      Right Ear: Tympanic membrane, ear canal and external ear normal.      Left Ear: Tympanic membrane, ear canal and external ear normal.      Nose: Nose normal. No congestion.      Right Sinus: No maxillary sinus tenderness or frontal sinus tenderness.      Left Sinus: No maxillary sinus tenderness or frontal sinus tenderness.      Mouth/Throat:      Mouth: Mucous membranes are moist.      Pharynx: Oropharynx is clear. No oropharyngeal exudate or posterior oropharyngeal erythema.   Eyes:      Extraocular Movements: Extraocular movements intact.      Conjunctiva/sclera: Conjunctivae normal.      Pupils: Pupils are equal, round, and reactive to light.   Neck:      Thyroid: No thyroid mass, thyromegaly or thyroid tenderness.      Vascular: No carotid bruit or JVD.   Cardiovascular:      Rate and Rhythm: Normal rate and regular rhythm.      Pulses: Normal pulses.      Heart sounds: Normal heart sounds.   Pulmonary:      Effort: Pulmonary effort " is normal. No respiratory distress.      Breath sounds: Normal breath sounds. No wheezing.   Abdominal:      General: Abdomen is flat. Bowel sounds are normal. There is no distension.      Palpations: Abdomen is soft. There is no hepatomegaly, splenomegaly or mass.      Tenderness: There is no abdominal tenderness. There is no guarding or rebound.      Hernia: No hernia is present.   Musculoskeletal:         General: Normal range of motion.      Cervical back: Neck supple.      Right lower leg: No edema.      Left lower leg: No edema.   Lymphadenopathy:      Cervical: No cervical adenopathy.   Skin:     General: Skin is warm and dry.      Capillary Refill: Capillary refill takes less than 2 seconds.   Neurological:      General: No focal deficit present.      Mental Status: He is alert and oriented to person, place, and time. Mental status is at baseline.      GCS: GCS eye subscore is 4. GCS verbal subscore is 5. GCS motor subscore is 6.      Cranial Nerves: Cranial nerves 2-12 are intact. No cranial nerve deficit.      Sensory: Sensation is intact. No sensory deficit.      Motor: Motor function is intact. No weakness.      Coordination: Coordination is intact. Coordination normal.      Gait: Gait is intact. Gait normal.      Deep Tendon Reflexes: Reflexes normal.   Psychiatric:         Attention and Perception: Attention and perception normal.         Mood and Affect: Mood and affect normal.         Speech: Speech normal.         Behavior: Behavior normal. Behavior is cooperative.         Thought Content: Thought content normal.         Cognition and Memory: Cognition and memory normal.         Judgment: Judgment normal.       CT ABDOMEN PELVIS WO CONTRAST     Date of Exam: 6/30/2023 2:10 PM EDT     Indication: severe epigastric and RUQ pain.     Comparison: None available.     Technique: Axial CT images were obtained of the abdomen and pelvis without the administration of contrast. Reconstructed coronal and  sagittal images were also obtained. Automated exposure control and iterative construction methods were used.        Findings:  The appendix is normal. The bowel does not appear abnormally thickened, dilated or inflamed. Mild colonic stool burden is present.     There is no urinary tract stone or hydronephrosis or perinephric inflammation.     The liver, gallbladder, spleen, pancreas, adrenals and kidneys have a normal noncontrast appearance.     The urinary bladder, prostate, and rectum are normal.     There are no pathologically enlarged lymph nodes. There is no ascites.     Heart size is normal. Lung bases are clear.     No acute or suspicious osseous abnormalities are identified.     IMPRESSION:  Impression:     1. No acute findings within the abdomen or pelvis.                 Electronically Signed: Francesca Dennison    6/30/2023 2:24 PM EDT    Workstation ID: [id number]     Latest Reference Range & Units 08/08/23 08:10   Sodium 134 - 144 mmol/L 140   Potassium 3.5 - 5.2 mmol/L 4.4   Chloride 96 - 106 mmol/L 100   CO2 20 - 29 mmol/L 24   BUN 6 - 20 mg/dL 15   Creatinine 0.76 - 1.27 mg/dL 1.06   BUN/Creatinine Ratio 9 - 20  14   EGFR Result >59 mL/min/1.73 94   Glucose 70 - 99 mg/dL 97   Calcium 8.7 - 10.2 mg/dL 9.6   Alkaline Phosphatase 44 - 121 IU/L 85   Total Protein 6.0 - 8.5 g/dL 7.3   Albumin 4.1 - 5.1 g/dL 5.0   A/G Ratio 1.2 - 2.2  2.2   AST (SGOT) 0 - 40 IU/L 17   ALT (SGPT) 0 - 44 IU/L 25   Total Bilirubin 0.0 - 1.2 mg/dL 0.9   Hemoglobin A1C 4.8 - 5.6 % 5.2   TSH Baseline 0.450 - 4.500 uIU/mL 2.390   Total Cholesterol 100 - 199 mg/dL 127   HDL Cholesterol >39 mg/dL 39 (L)   LDL Cholesterol  0 - 99 mg/dL 67   Triglycerides 0 - 149 mg/dL 112   VLDL Cholesterol Mani 5 - 40 mg/dL 21   Globulin 1.5 - 4.5 g/dL 2.3   WBC 3.4 - 10.8 x10E3/uL 5.2   RBC 4.14 - 5.80 x10E6/uL 4.95   Hemoglobin 13.0 - 17.7 g/dL 14.8   Hematocrit 37.5 - 51.0 % 43.9   Platelets 150 - 450 x10E3/uL 194   RDW 11.6 - 15.4 % 12.2   MCV 79 - 97  fL 89   MCH 26.6 - 33.0 pg 29.9   MCHC 31.5 - 35.7 g/dL 33.7   Hep C Virus Ab Non Reactive  Non Reactive   (L): Data is abnormally low    Assessment / Plan      Assessment/Plan:   Diagnoses and all orders for this visit:    1. Annual physical exam (Primary)  -Advice and education given regarding routine dental evaluations, routine eye exams, reproductive health, cardiovascular risk reduction, sunscreen use, and seatbelt use (generall overall safety). Annual wellness evaluations recommended.    -Reviewed labs from 8/8 in detail with pt at time of visit    2. Primary hypertension  -Controlled, continue current tx  -ramipril 5mg qd, declines RF at this time  -DASH and regular exercise.     3. Mixed hyperlipidemia  -Applauded pt on lifestyle modifications with diet and exercise as it reflects on repeat FLP compared to 6m ago.   -Controlled, continue current tx. Declines RF of atorvastatin 20mg qd at this time.   -continue LSM. Maintain normal BMI  -FLP 8/2023: TC- 127, trig- 112, HDL- 39, LDL67, VLDL- 21.     4. RAFAEL (generalized anxiety disorder)  -RAFAEL 7= 4 today  -Improved and well controlled after recent dose increase on 6/28, continue current tx  -Continue paxil 20qd  -consider addition of  referral for counseling services prn in the future.     5. Rectal bleeding  -Continue increased dietary fiber or OTC supplementation in addition to regular exercise and increased water intake. Additionally, recommended daily stool softener such as docusate sodium once daily to keep stools soft and prevent straining. This should decrease bleeding with BMs associated with hard stools. Hold docusate for loose stools. Will defer further recommendation/ ongoing mgmt to Dr Yung after GI apt next week    6. Bowel habit changes  7. Gastroesophageal reflux disease, unspecified whether esophagitis present  8. Abdominal pain, unspecified abdominal location  -I am very pleased that his GI sxs are improved, but plan to proceed with GI  referral next week on 8/14 as scheduled   -normal CT abd/ pelvis on 6/2023  -continue omeprazole and famotidine as prev prescribed-- will defer further recommendation/ ongoing mgmt to Dr Yung after GI apt next week.          Follow Up:   Return in about 6 months (around 2/10/2024) for Recheck.      I spent approximately 45 minutes providing clinical care for this patient; including review of patient's chart and provider documentation, face to face time spent with patient in examination room (obtaining history, performing physical exam, discussing diagnosis and management options), placing orders, and completing patient documentation.    Healthcare Maintenance:     - Colon Cancer Screening / Colonoscopy:  No family history of colon cancer.  Will start at age 44 or per GI recommendations, upcoming apt 8/14  - HCV Screening: negative   - Immunizations: overdue covid discussed, otherwise UTD  - Cardiovascular Health Screening / ASCVD Risk:  No family history of MI or stroke.  Recommended 150 minutes of moderate intensity physical activity each week and a diet rich in vegetables and unsaturated fat while avoiding saturated fats and processed foods as able.   - Depression Screening: PHQ2 - 0, negative screening 8/2023    GARRY Harden  [facility]     ADDENDUM 9/21/23: I reviewed records from Dr Yung ofc. EGD notable for hiatal hernia and gastritis without h. Pylori. Recommendation to avoid NSAIDs. Colonoscopy procedure report notes normal colon with recommendation to repeat colorectal screening at age 44, also noted that if pt fails to improve anorectal manometry can be considered.

## 2023-10-29 DIAGNOSIS — J34.1 CYST OF NASAL CAVITY: Primary | ICD-10-CM

## 2023-11-04 RX ORDER — ATORVASTATIN CALCIUM 20 MG/1
TABLET, FILM COATED ORAL
Qty: 90 TABLET | Refills: 1 | Status: SHIPPED | OUTPATIENT
Start: 2023-11-04

## 2023-11-15 DIAGNOSIS — I10 PRIMARY HYPERTENSION: ICD-10-CM

## 2023-11-16 RX ORDER — RAMIPRIL 5 MG/1
5 CAPSULE ORAL DAILY
Qty: 90 CAPSULE | Refills: 1 | Status: SHIPPED | OUTPATIENT
Start: 2023-11-16

## 2023-11-27 DIAGNOSIS — K21.9 GASTROESOPHAGEAL REFLUX DISEASE, UNSPECIFIED WHETHER ESOPHAGITIS PRESENT: ICD-10-CM

## 2023-11-27 RX ORDER — OMEPRAZOLE 40 MG/1
40 CAPSULE, DELAYED RELEASE ORAL DAILY
Qty: 90 CAPSULE | Refills: 1 | Status: SHIPPED | OUTPATIENT
Start: 2023-11-27

## 2024-02-02 DIAGNOSIS — K21.9 GASTROESOPHAGEAL REFLUX DISEASE, UNSPECIFIED WHETHER ESOPHAGITIS PRESENT: ICD-10-CM

## 2024-02-02 RX ORDER — FAMOTIDINE 40 MG/1
40 TABLET, FILM COATED ORAL DAILY
Qty: 90 TABLET | Refills: 1 | Status: SHIPPED | OUTPATIENT
Start: 2024-02-02

## 2024-02-10 ENCOUNTER — OFFICE VISIT (OUTPATIENT)
Dept: INTERNAL MEDICINE | Facility: CLINIC | Age: 34
End: 2024-02-10
Payer: COMMERCIAL

## 2024-02-10 VITALS
RESPIRATION RATE: 18 BRPM | WEIGHT: 177 LBS | DIASTOLIC BLOOD PRESSURE: 82 MMHG | BODY MASS INDEX: 25.34 KG/M2 | TEMPERATURE: 98.4 F | OXYGEN SATURATION: 99 % | SYSTOLIC BLOOD PRESSURE: 132 MMHG | HEIGHT: 70 IN | HEART RATE: 92 BPM

## 2024-02-10 DIAGNOSIS — J30.2 SEASONAL ALLERGIES: ICD-10-CM

## 2024-02-10 DIAGNOSIS — E78.2 MIXED HYPERLIPIDEMIA: ICD-10-CM

## 2024-02-10 DIAGNOSIS — K62.5 RECTAL BLEEDING: ICD-10-CM

## 2024-02-10 DIAGNOSIS — R20.0 NUMBNESS AND TINGLING OF LEFT LOWER EXTREMITY: ICD-10-CM

## 2024-02-10 DIAGNOSIS — K21.00 GASTROESOPHAGEAL REFLUX DISEASE WITH ESOPHAGITIS WITHOUT HEMORRHAGE: ICD-10-CM

## 2024-02-10 DIAGNOSIS — R10.9 ABDOMINAL PAIN, UNSPECIFIED ABDOMINAL LOCATION: ICD-10-CM

## 2024-02-10 DIAGNOSIS — I10 PRIMARY HYPERTENSION: Primary | ICD-10-CM

## 2024-02-10 DIAGNOSIS — F41.1 GAD (GENERALIZED ANXIETY DISORDER): ICD-10-CM

## 2024-02-10 DIAGNOSIS — J34.1 CYST OF NASAL CAVITY: ICD-10-CM

## 2024-02-10 DIAGNOSIS — R20.2 NUMBNESS AND TINGLING OF LEFT LOWER EXTREMITY: ICD-10-CM

## 2024-02-10 PROCEDURE — 99214 OFFICE O/P EST MOD 30 MIN: CPT | Performed by: NURSE PRACTITIONER

## 2024-02-10 NOTE — PROGRESS NOTES
"     Follow Up Office Visit      Date: 02/10/2024   Patient Name: Andres Corral  : 1990   MRN: [id number]     Chief Complaint:    Chief Complaint   Patient presents with    Hypertension     Pt states some tingling in left foot after sitting for long times    Hyperlipidemia   Answers submitted by the patient for this visit:  Primary Reason for Visit (Submitted on 2024)  What is the primary reason for your visit?: Other  Other (Submitted on 2024)  Please describe your symptoms.: Follow up/physical  Have you had these symptoms before?: Yes  How long have you been having these symptoms?: 1-4 days      History of Present Illness: Andres Corral is a 33 y.o. male who is here today to follow up.  Overall he is doing well since last visit.  GI symptoms remain resolved since endoscopies with Dr. Yung at [facility].  He does continue avoidance of NSAIDs as well.  At this time he is taking 40 mg omeprazole in the a.m. and famotidine in the p.m.  This combo is working well and he denies any abdominal pain or other recurrent episodes of GI bleeding.  He also continues ramipril 5 mg qd but is not monitoring BP at home.  Asymptomatic.  Does endorse occasional headache which she attributes to weather changes and seasonal allergies.  Compliant with atorvastatin and tolerating well.  FLP with last physical in 2023 at goal. He continues with ongoing healthful diet changes since last visit and is compliant with DASH/ cholesterol friendly diet, although does enjoy occasional sweets. No regular exercise. Mood is overall well-controlled on Paxil 20 mg.  Inquires about occasionally feeling in a \"fog\" state.  Not interested in any dose adjustments of medication as overall his anxiety is much improved on current dose.  Lastly, he does report acute changes which include numbness and tingling of his left foot and occasionally posterior thigh.  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     He declines needing any medication RFs at this time.       Subjective      Review of Systems:   Review of Systems   Constitutional: Negative.    HENT: Negative.     Eyes:  Negative for blurred vision and visual disturbance.   Respiratory: Negative.     Cardiovascular: Negative.    Gastrointestinal: Negative.    Musculoskeletal:  Negative for arthralgias, gait problem and myalgias.   Skin: Negative.    Allergic/Immunologic: Positive for environmental allergies.   Neurological:  Positive for numbness and headache. Negative for dizziness, weakness, light-headedness and memory problem.   Psychiatric/Behavioral:  Negative for decreased concentration, dysphoric mood, suicidal ideas, depressed mood and stress. The patient is nervous/anxious.        I have reviewed the patients family history, social history, past medical history, past surgical history and have updated it as appropriate.     Medications:     Current Outpatient Medications:     atorvastatin (LIPITOR) 20 MG tablet, TAKE ONE TABLET BY MOUTH DAILY, Disp: 90 tablet, Rfl: 1    famotidine (PEPCID) 40 MG tablet, TAKE 1 TABLET BY MOUTH DAILY, Disp: 90 tablet, Rfl: 1    fluticasone (CUTIVATE) 0.05 % cream, Apply 1 application topically to the appropriate area as directed 2 (Two) Times a Day., Disp: 60 g, Rfl: 5    omeprazole (priLOSEC) 40 MG capsule, TAKE 1 CAPSULE BY MOUTH DAILY, Disp: 90 capsule, Rfl: 1    PARoxetine (PAXIL) 20 MG tablet, Take 1 tablet by mouth Every Morning., Disp: 90 tablet, Rfl: 3    ramipril (ALTACE) 5 MG capsule, Take 1 capsule by mouth Daily., Disp: 90 capsule, Rfl: 1    Allergies:   No Known Allergies    Objective     Physical Exam: Please see above  Vital Signs:   Vitals:    02/10/24 0906   BP: 132/82   Pulse: 92   Resp: 18   Temp: 98.4  "°F (36.9 °C)   SpO2: 99%   Weight: 80.3 kg (177 lb)   Height: 177.8 cm (70\")   PainSc: 0-No pain     Body mass index is 25.4 kg/m².    Physical Exam  Vitals and nursing note reviewed.   Constitutional:       General: He is not in acute distress.     Appearance: Normal appearance. He is normal weight. He is not ill-appearing or diaphoretic.   HENT:      Head: Normocephalic and atraumatic.      Nose: Nose normal.      Mouth/Throat:      Mouth: Mucous membranes are moist.      Pharynx: Oropharynx is clear.   Neck:      Vascular: No carotid bruit.   Cardiovascular:      Rate and Rhythm: Normal rate and regular rhythm.      Heart sounds: Normal heart sounds. No murmur heard.  Pulmonary:      Effort: Pulmonary effort is normal. No respiratory distress.      Breath sounds: Normal breath sounds. No wheezing.   Abdominal:      Palpations: Abdomen is soft.      Tenderness: There is no abdominal tenderness.   Musculoskeletal:      Cervical back: Neck supple.      Thoracic back: Normal.      Lumbar back: Normal. No swelling, spasms or bony tenderness. Normal range of motion. Negative right straight leg raise test and negative left straight leg raise test.      Right upper leg: Normal.      Left upper leg: Normal.      Right lower leg: Normal. No edema.      Left lower leg: Normal. No edema.      Right foot: Normal. Normal capillary refill. Normal pulse.      Left foot: Normal. Normal capillary refill. Normal pulse.   Lymphadenopathy:      Cervical: No cervical adenopathy.   Skin:     General: Skin is warm and dry.   Neurological:      General: No focal deficit present.      Mental Status: He is alert and oriented to person, place, and time. Mental status is at baseline.      Sensory: No sensory deficit.      Motor: No weakness.      Coordination: Coordination normal.      Gait: Gait normal.      Deep Tendon Reflexes: Reflexes normal.   Psychiatric:         Attention and Perception: Attention and perception normal.         Mood " and Affect: Mood and affect normal.         Speech: Speech normal.         Behavior: Behavior normal. Behavior is cooperative.         Thought Content: Thought content normal.         Cognition and Memory: Cognition and memory normal.         Judgment: Judgment normal.             Results:   Imaging:     Labs:        Assessment / Plan      Assessment/Plan:   Diagnoses and all orders for this visit:    1. Primary hypertension (Primary)  -controlled, continue ramipril 5mg daily  -see plan #8, if adding po antihistamine prn does not resolve occasional HAs or if he develops other si/sx of uncontrolled HTN-- he will begin keeping log of BP and HR daily, and call the office to schedule office visit for re-check and bring log & home auto bp cuff to apt.    2. Mixed hyperlipidemia  -last FLP 8/2023: TC- 127, HDL- 39, LDL- 67, trig- 112  -discussed diet recommendations, applauded consistent efforts with healthful, sustainable food swaps. Encouraged implementation of regular exercise-- 150 min/ week moderate intensity.   -continue atorvastatin 20mg qd     3. Gastroesophageal reflux disease with esophagitis without hemorrhage  -consulted with Dr Yung @[facility] who completed upper and lower endoscopies in 9/2023. EGD: Revealed gastritis without h.pylori as well as a hiatal hernia. Continue avoidance of NSAIDs. Colonoscopy: Unremarkable. Continue high fiber diet with return to routine screening, next at age 44. Per Dr Yung last note, he is happy to see pt back PRN for any recurrent GI problem  -normal CT abd/pelvis on 6/2023  -continue omeprazole 40mg qAM, and famotidine 40mg qPM    4. Rectal bleeding  5. Abdominal pain, unspecified abdominal location  -both remain resolved    6. RAFAEL (generalized anxiety disorder)  -GAD7= 4 on 8/2023, PHQ2= 0 on 8/2023.  -Overall well controlled on current tx. Paxil dose was increased in 6/2023. Recommended consideration of addition to counseling to current pharmacologic tx. He will think about it  and let me know if agreeable to  referral. If Andres calls the office and decides to move forward with this, I will place referral order to [facility].    -continue Paxil 20mg daily    7. Cyst of nasal cavity  -Consulted Dr Castro of ENT who excised papilloma on right anterior septum on 11-7-23. No recurrent issue.     8. Seasonal allergies  -consider addition of OTC second generation antihistamine once daily PRN during bothersome allergy seasons     9. Numbness and tingling of left lower extremity  -reassurance provided. Continue to monitor. He will look into a better office chair since will be working from home for foreseeable future. For any continued or worsening symptoms he will let me know and further evaluation/ additional recommendations can be advised.        Follow Up:   Return if symptoms worsen or fail to improve, for Next scheduled follow up.  -f/u with Dr Morris as scheduled in August for annual physical and to establish care with her as new PCP    GARRY Harden  [facility] [place]

## 2024-05-15 DIAGNOSIS — I10 PRIMARY HYPERTENSION: ICD-10-CM

## 2024-05-15 RX ORDER — RAMIPRIL 5 MG/1
5 CAPSULE ORAL DAILY
Qty: 90 CAPSULE | Refills: 1 | Status: SHIPPED | OUTPATIENT
Start: 2024-05-15

## 2024-05-17 DIAGNOSIS — E78.2 MIXED HYPERLIPIDEMIA: Primary | ICD-10-CM

## 2024-05-20 RX ORDER — ATORVASTATIN CALCIUM 20 MG/1
20 TABLET, FILM COATED ORAL DAILY
Qty: 90 TABLET | Refills: 3 | Status: SHIPPED | OUTPATIENT
Start: 2024-05-20

## 2024-05-30 DIAGNOSIS — K21.9 GASTROESOPHAGEAL REFLUX DISEASE, UNSPECIFIED WHETHER ESOPHAGITIS PRESENT: ICD-10-CM

## 2024-05-30 RX ORDER — OMEPRAZOLE 40 MG/1
40 CAPSULE, DELAYED RELEASE ORAL DAILY
Qty: 90 CAPSULE | Refills: 1 | Status: SHIPPED | OUTPATIENT
Start: 2024-05-30

## 2024-06-19 ENCOUNTER — OFFICE VISIT (OUTPATIENT)
Dept: INTERNAL MEDICINE | Facility: CLINIC | Age: 34
End: 2024-06-19
Payer: COMMERCIAL

## 2024-06-19 ENCOUNTER — LAB (OUTPATIENT)
Dept: LAB | Facility: HOSPITAL | Age: 34
End: 2024-06-19
Payer: COMMERCIAL

## 2024-06-19 VITALS
SYSTOLIC BLOOD PRESSURE: 144 MMHG | HEART RATE: 78 BPM | BODY MASS INDEX: 27.4 KG/M2 | DIASTOLIC BLOOD PRESSURE: 86 MMHG | WEIGHT: 191.4 LBS | OXYGEN SATURATION: 98 % | TEMPERATURE: 97.4 F | HEIGHT: 70 IN

## 2024-06-19 DIAGNOSIS — K21.9 GASTROESOPHAGEAL REFLUX DISEASE WITHOUT ESOPHAGITIS: ICD-10-CM

## 2024-06-19 DIAGNOSIS — I10 PRIMARY HYPERTENSION: ICD-10-CM

## 2024-06-19 DIAGNOSIS — F41.1 GAD (GENERALIZED ANXIETY DISORDER): ICD-10-CM

## 2024-06-19 DIAGNOSIS — E78.2 MIXED HYPERLIPIDEMIA: ICD-10-CM

## 2024-06-19 DIAGNOSIS — Z00.00 HEALTHCARE MAINTENANCE: ICD-10-CM

## 2024-06-19 DIAGNOSIS — I10 PRIMARY HYPERTENSION: Primary | ICD-10-CM

## 2024-06-19 DIAGNOSIS — Z13.29 SCREENING FOR THYROID DISORDER: ICD-10-CM

## 2024-06-19 DIAGNOSIS — K29.50 CHRONIC GASTRITIS WITHOUT BLEEDING, UNSPECIFIED GASTRITIS TYPE: ICD-10-CM

## 2024-06-19 DIAGNOSIS — Z13.1 SCREENING FOR DIABETES MELLITUS: ICD-10-CM

## 2024-06-19 LAB — HBA1C MFR BLD: 5.2 % (ref 4.8–5.6)

## 2024-06-19 PROCEDURE — 99214 OFFICE O/P EST MOD 30 MIN: CPT | Performed by: INTERNAL MEDICINE

## 2024-06-19 PROCEDURE — 83036 HEMOGLOBIN GLYCOSYLATED A1C: CPT

## 2024-06-19 PROCEDURE — 80050 GENERAL HEALTH PANEL: CPT

## 2024-06-19 PROCEDURE — 80061 LIPID PANEL: CPT

## 2024-06-19 RX ORDER — RAMIPRIL 10 MG/1
10 CAPSULE ORAL DAILY
Qty: 90 CAPSULE | Refills: 1 | Status: SHIPPED | OUTPATIENT
Start: 2024-06-19

## 2024-06-19 NOTE — PROGRESS NOTES
"Internal Medicine Follow Up    Chief Complaint  Andres Corral is a 33 y.o. male who presents today for follow up of chronic medical conditions outlined below.    Chief Complaint   Patient presents with    Hypertension    Establish Care        HPI  Mr. Corral comes in today to establish care. He does not routinely follow with any specialists aside from yearly visit with dermatology. He has HTN, HLD, RAFAEL, GERD and gastritis. He is on ramipril, atorvastatin, paxil, omeprazole, and famotidine. BP running high and has for some time based on home readings from February. He is open to adjusting medication. No acute complaints today.         Review of Systems  Review of Systems   Constitutional: Negative.    Respiratory: Negative.     Cardiovascular: Negative.    Gastrointestinal: Negative.    Psychiatric/Behavioral:  The patient is nervous/anxious.         Current Medications  Current Outpatient Medications on File Prior to Visit   Medication Sig Dispense Refill    atorvastatin (LIPITOR) 20 MG tablet Take 1 tablet by mouth Daily. 90 tablet 3    famotidine (PEPCID) 40 MG tablet TAKE 1 TABLET BY MOUTH DAILY 90 tablet 1    omeprazole (priLOSEC) 40 MG capsule TAKE 1 CAPSULE BY MOUTH DAILY 90 capsule 1    PARoxetine (PAXIL) 20 MG tablet Take 1 tablet by mouth Every Morning. 90 tablet 3    ramipril (ALTACE) 5 MG capsule TAKE 1 CAPSULE BY MOUTH DAILY 90 capsule 1    fluticasone (CUTIVATE) 0.05 % cream Apply 1 application topically to the appropriate area as directed 2 (Two) Times a Day. 60 g 5     No current facility-administered medications on file prior to visit.       Allergies  No Known Allergies    Objective  Visit Vitals  /90   Pulse 78   Temp 97.4 °F (36.3 °C)   Ht 177.8 cm (70\")   Wt 86.8 kg (191 lb 6.4 oz)   SpO2 98% Comment: ra   BMI 27.46 kg/m²        Physical Exam  Physical Exam  Vitals and nursing note reviewed.   Constitutional:       General: He is not in acute distress.     Appearance: He is well-developed. " He is not ill-appearing or toxic-appearing.   HENT:      Head: Normocephalic and atraumatic.      Right Ear: Tympanic membrane, ear canal and external ear normal.      Left Ear: Tympanic membrane, ear canal and external ear normal.   Eyes:      Conjunctiva/sclera: Conjunctivae normal.   Cardiovascular:      Rate and Rhythm: Normal rate and regular rhythm.      Heart sounds: Normal heart sounds. No murmur heard.  Pulmonary:      Effort: Pulmonary effort is normal. No respiratory distress.      Breath sounds: Normal breath sounds.   Abdominal:      General: There is no distension.      Palpations: Abdomen is soft. There is no mass.      Tenderness: There is no abdominal tenderness.   Musculoskeletal:      Right lower leg: No edema.      Left lower leg: No edema.   Skin:     General: Skin is warm and dry.   Neurological:      General: No focal deficit present.      Mental Status: He is alert and oriented to person, place, and time.      Gait: Gait normal.   Psychiatric:         Mood and Affect: Mood normal.         Behavior: Behavior normal.         Thought Content: Thought content normal.         Judgment: Judgment normal.         Results  Results for orders placed or performed in visit on 08/08/23   Hepatitis C Antibody    Specimen: Arm, Left; Blood   Result Value Ref Range    Hep C Virus Ab Non Reactive Non Reactive   CBC (No Diff)    Specimen: Arm, Left; Blood   Result Value Ref Range    WBC 5.2 3.4 - 10.8 x10E3/uL    RBC 4.95 4.14 - 5.80 x10E6/uL    Hemoglobin 14.8 13.0 - 17.7 g/dL    Hematocrit 43.9 37.5 - 51.0 %    MCV 89 79 - 97 fL    MCH 29.9 26.6 - 33.0 pg    MCHC 33.7 31.5 - 35.7 g/dL    RDW 12.2 11.6 - 15.4 %    Platelets 194 150 - 450 x10E3/uL   Comprehensive Metabolic Panel    Specimen: Arm, Left; Blood   Result Value Ref Range    Glucose 97 70 - 99 mg/dL    BUN 15 6 - 20 mg/dL    Creatinine 1.06 0.76 - 1.27 mg/dL    EGFR Result 94 >59 mL/min/1.73    BUN/Creatinine Ratio 14 9 - 20    Sodium 140 134 - 144  mmol/L    Potassium 4.4 3.5 - 5.2 mmol/L    Chloride 100 96 - 106 mmol/L    Total CO2 24 20 - 29 mmol/L    Calcium 9.6 8.7 - 10.2 mg/dL    Total Protein 7.3 6.0 - 8.5 g/dL    Albumin 5.0 4.1 - 5.1 g/dL    Globulin 2.3 1.5 - 4.5 g/dL    A/G Ratio 2.2 1.2 - 2.2    Total Bilirubin 0.9 0.0 - 1.2 mg/dL    Alkaline Phosphatase 85 44 - 121 IU/L    AST (SGOT) 17 0 - 40 IU/L    ALT (SGPT) 25 0 - 44 IU/L   Hemoglobin A1c    Specimen: Arm, Left; Blood   Result Value Ref Range    Hemoglobin A1C 5.2 4.8 - 5.6 %   Lipid Panel    Specimen: Arm, Left; Blood   Result Value Ref Range    Total Cholesterol 127 100 - 199 mg/dL    Triglycerides 112 0 - 149 mg/dL    HDL Cholesterol 39 (L) >39 mg/dL    VLDL Cholesterol Mani 21 5 - 40 mg/dL    LDL Chol Calc (NIH) 67 0 - 99 mg/dL   TSH Rfx On Abnormal To Free T4    Specimen: Arm, Left; Blood   Result Value Ref Range    TSH 2.390 0.450 - 4.500 uIU/mL        Assessment and Plan  Diagnoses and all orders for this visit:    Primary hypertension  - BP elevated  - increase ramipril to 10mg daily  - CMP today. Repeat BMP in 1-2 weeks.  - follow up with home BP log in 2 months.    Chronic gastritis without bleeding, unspecified gastritis type  Gastroesophageal reflux disease without esophagitis  - chronic gastritis noted on EGD 9/2023, no bleeding  - controlled on omeprazole 40mg daily and pepcid qhs  - notes inability to stop PPI    Mixed hyperlipidemia  - continue atorvastatin 20mg daily and update lipid panel    RAFAEL (generalized anxiety disorder)  - stable on paxil 20mg daily, will continue    Screening for thyroid disorder  -     TSH Rfx On Abnormal To Free T4; Future    Healthcare maintenance  -     CBC (No Diff); Future    Screening for diabetes mellitus  -     Hemoglobin A1c; Future    Health Maintenance  - Colonoscopy: Start screening at age 44.  - HCV: negative  - Immunizations: Tdap 10/2017. COVID discussed.  - Depression screening: negative 6/2024    Return in about 2 months (around  8/19/2024) for Follow up HTN, 6 months for annual, Labs today.

## 2024-06-20 ENCOUNTER — TELEPHONE (OUTPATIENT)
Dept: INTERNAL MEDICINE | Facility: CLINIC | Age: 34
End: 2024-06-20
Payer: COMMERCIAL

## 2024-06-20 DIAGNOSIS — E78.1 HYPERTRIGLYCERIDEMIA: Primary | ICD-10-CM

## 2024-06-20 LAB
ALBUMIN SERPL-MCNC: 4.6 G/DL (ref 3.5–5.2)
ALBUMIN/GLOB SERPL: 2.2 G/DL
ALP SERPL-CCNC: 92 U/L (ref 39–117)
ALT SERPL W P-5'-P-CCNC: 40 U/L (ref 1–41)
ANION GAP SERPL CALCULATED.3IONS-SCNC: 9.1 MMOL/L (ref 5–15)
AST SERPL-CCNC: 20 U/L (ref 1–40)
BILIRUB SERPL-MCNC: 0.2 MG/DL (ref 0–1.2)
BUN SERPL-MCNC: 14 MG/DL (ref 6–20)
BUN/CREAT SERPL: 16.5 (ref 7–25)
CALCIUM SPEC-SCNC: 9.1 MG/DL (ref 8.6–10.5)
CHLORIDE SERPL-SCNC: 106 MMOL/L (ref 98–107)
CHOLEST SERPL-MCNC: 188 MG/DL (ref 0–200)
CO2 SERPL-SCNC: 23.9 MMOL/L (ref 22–29)
CREAT SERPL-MCNC: 0.85 MG/DL (ref 0.76–1.27)
DEPRECATED RDW RBC AUTO: 39.7 FL (ref 37–54)
EGFRCR SERPLBLD CKD-EPI 2021: 116.9 ML/MIN/1.73
ERYTHROCYTE [DISTWIDTH] IN BLOOD BY AUTOMATED COUNT: 12.5 % (ref 12.3–15.4)
GLOBULIN UR ELPH-MCNC: 2.1 GM/DL
GLUCOSE SERPL-MCNC: 96 MG/DL (ref 65–99)
HCT VFR BLD AUTO: 40.8 % (ref 37.5–51)
HDLC SERPL-MCNC: 36 MG/DL (ref 40–60)
HGB BLD-MCNC: 14.3 G/DL (ref 13–17.7)
LDLC SERPL CALC-MCNC: 46 MG/DL (ref 0–100)
LDLC/HDLC SERPL: 0.04 {RATIO}
MCH RBC QN AUTO: 30.8 PG (ref 26.6–33)
MCHC RBC AUTO-ENTMCNC: 35 G/DL (ref 31.5–35.7)
MCV RBC AUTO: 87.7 FL (ref 79–97)
PLATELET # BLD AUTO: 218 10*3/MM3 (ref 140–450)
PMV BLD AUTO: 9.5 FL (ref 6–12)
POTASSIUM SERPL-SCNC: 4.3 MMOL/L (ref 3.5–5.2)
PROT SERPL-MCNC: 6.7 G/DL (ref 6–8.5)
RBC # BLD AUTO: 4.65 10*6/MM3 (ref 4.14–5.8)
SODIUM SERPL-SCNC: 139 MMOL/L (ref 136–145)
TRIGL SERPL-MCNC: 753 MG/DL (ref 0–150)
TSH SERPL DL<=0.05 MIU/L-ACNC: 0.87 UIU/ML (ref 0.27–4.2)
VLDLC SERPL-MCNC: 106 MG/DL (ref 5–40)
WBC NRBC COR # BLD AUTO: 6.31 10*3/MM3 (ref 3.4–10.8)

## 2024-06-20 NOTE — TELEPHONE ENCOUNTER
----- Message from Mima Morris sent at 6/20/2024  1:37 PM EDT -----  Please let patient know that liver and kidney function, thyroid function, blood counts, and diabetes screening all normal. Cholesterol panel shows high triglycerides which may be due to not fasting. I recommend he return for fasting recheck.

## 2024-06-21 ENCOUNTER — TELEPHONE (OUTPATIENT)
Dept: INTERNAL MEDICINE | Facility: CLINIC | Age: 34
End: 2024-06-21
Payer: COMMERCIAL

## 2024-06-21 NOTE — TELEPHONE ENCOUNTER
Spoke with patient regarding results and recommended recheck of labs. Patient was understanding and appreciative    ----- Message from Mima Morris sent at 6/20/2024  1:37 PM EDT -----  Please let patient know that liver and kidney function, thyroid function, blood counts, and diabetes screening all normal. Cholesterol panel shows high triglycerides which may be due to not fasting. I recommend he return for fasting recheck.

## 2024-06-27 ENCOUNTER — LAB (OUTPATIENT)
Dept: LAB | Facility: HOSPITAL | Age: 34
End: 2024-06-27
Payer: COMMERCIAL

## 2024-06-27 DIAGNOSIS — E78.1 HYPERTRIGLYCERIDEMIA: ICD-10-CM

## 2024-06-27 DIAGNOSIS — I10 PRIMARY HYPERTENSION: ICD-10-CM

## 2024-06-27 LAB
ANION GAP SERPL CALCULATED.3IONS-SCNC: 10 MMOL/L (ref 5–15)
BUN SERPL-MCNC: 17 MG/DL (ref 6–20)
BUN/CREAT SERPL: 15.6 (ref 7–25)
CALCIUM SPEC-SCNC: 9.7 MG/DL (ref 8.6–10.5)
CHLORIDE SERPL-SCNC: 100 MMOL/L (ref 98–107)
CHOLEST SERPL-MCNC: 176 MG/DL (ref 0–200)
CO2 SERPL-SCNC: 28 MMOL/L (ref 22–29)
CREAT SERPL-MCNC: 1.09 MG/DL (ref 0.76–1.27)
EGFRCR SERPLBLD CKD-EPI 2021: 91.3 ML/MIN/1.73
GLUCOSE SERPL-MCNC: 92 MG/DL (ref 65–99)
HDLC SERPL-MCNC: 40 MG/DL (ref 40–60)
LDLC SERPL CALC-MCNC: 96 MG/DL (ref 0–100)
LDLC/HDLC SERPL: 2.21 {RATIO}
POTASSIUM SERPL-SCNC: 4.3 MMOL/L (ref 3.5–5.2)
SODIUM SERPL-SCNC: 138 MMOL/L (ref 136–145)
TRIGL SERPL-MCNC: 238 MG/DL (ref 0–150)
VLDLC SERPL-MCNC: 40 MG/DL (ref 5–40)

## 2024-06-27 PROCEDURE — 80061 LIPID PANEL: CPT

## 2024-06-27 PROCEDURE — 80048 BASIC METABOLIC PNL TOTAL CA: CPT

## 2024-06-29 DIAGNOSIS — F41.1 GAD (GENERALIZED ANXIETY DISORDER): ICD-10-CM

## 2024-07-01 RX ORDER — PAROXETINE HYDROCHLORIDE 20 MG/1
20 TABLET, FILM COATED ORAL EVERY MORNING
Qty: 90 TABLET | Refills: 0 | Status: SHIPPED | OUTPATIENT
Start: 2024-07-01

## 2024-08-09 DIAGNOSIS — K21.9 GASTROESOPHAGEAL REFLUX DISEASE, UNSPECIFIED WHETHER ESOPHAGITIS PRESENT: ICD-10-CM

## 2024-08-09 RX ORDER — FAMOTIDINE 40 MG/1
40 TABLET, FILM COATED ORAL DAILY
Qty: 90 TABLET | Refills: 1 | OUTPATIENT
Start: 2024-08-09

## 2024-08-19 ENCOUNTER — OFFICE VISIT (OUTPATIENT)
Dept: INTERNAL MEDICINE | Facility: CLINIC | Age: 34
End: 2024-08-19
Payer: COMMERCIAL

## 2024-08-19 VITALS
OXYGEN SATURATION: 98 % | HEART RATE: 86 BPM | HEIGHT: 70 IN | SYSTOLIC BLOOD PRESSURE: 126 MMHG | WEIGHT: 189 LBS | DIASTOLIC BLOOD PRESSURE: 86 MMHG | BODY MASS INDEX: 27.06 KG/M2

## 2024-08-19 DIAGNOSIS — I10 PRIMARY HYPERTENSION: ICD-10-CM

## 2024-08-19 DIAGNOSIS — E78.2 MIXED HYPERLIPIDEMIA: ICD-10-CM

## 2024-08-19 DIAGNOSIS — K21.9 GASTROESOPHAGEAL REFLUX DISEASE, UNSPECIFIED WHETHER ESOPHAGITIS PRESENT: ICD-10-CM

## 2024-08-19 DIAGNOSIS — F41.1 GAD (GENERALIZED ANXIETY DISORDER): ICD-10-CM

## 2024-08-19 DIAGNOSIS — Z09 FOLLOW-UP EXAMINATION: Primary | ICD-10-CM

## 2024-08-19 PROCEDURE — 99214 OFFICE O/P EST MOD 30 MIN: CPT | Performed by: NURSE PRACTITIONER

## 2024-08-19 RX ORDER — FAMOTIDINE 40 MG/1
40 TABLET, FILM COATED ORAL DAILY
Qty: 90 TABLET | Refills: 1 | Status: SHIPPED | OUTPATIENT
Start: 2024-08-19

## 2024-08-19 NOTE — PROGRESS NOTES
Office Note     Name: Andres Corral    : 1990     MRN: [id number]     Chief Complaint  Hypertension (2 month follow up ) and Hyperlipidemia (2 month follow up )    Subjective     History of Present Illness:  Andres Corral is a 34 y.o. male who presents today for a 2-month follow-up on hypertension and hyperlipidemia.  Patient follows with Dr. Morris for chronic conditions.  At the previous visit, his ramipril dose was increased to 10 mg daily.  He is currently tolerating this medication well without side effects.  Blood pressure is much better controlled in the office today.  Patient is also working on trying to start walking and increasing his exercise.  He reports last year he was running frequently but has done so less often this year due to being busy with his children.  He has a 6-year-old daughter and a 1-1/2-year-old son.  He has made some dietary modifications.  He has tried to cut down on his rodriguez intake.  He has French fries maybe once monthly.  He is also trying to choose grilled chicken options over fried chicken.  He is working on making better food choices.  His triglycerides at the previous check were improved but still elevated at 238.  Patient is aware of this and will continue to work on dietary and lifestyle modifications.  He is currently taking atorvastatin 20 mg daily for control of hyperlipidemia.  He is taking paroxetine 20 mg daily to assist with control of anxiety symptoms.  He is also taking omeprazole and famotidine daily to assist with acid reflux symptoms.  Overall, the patient is doing well without current complaints or concerns.  Pleasant visit with the patient today.      Past Medical History:   Diagnosis Date    Anxiety     Gastroesophageal reflux disease 2022    GERD (gastroesophageal reflux disease)     Hyperlipidemia     Hypertension     Mixed hyperlipidemia 2022    Primary hypertension 2022       Past Surgical History:   Procedure Laterality Date     "COLONOSCOPY  09/15/2023    Normal    WISDOM TOOTH EXTRACTION         Social History     Socioeconomic History    Marital status:    Tobacco Use    Smoking status: Never    Smokeless tobacco: Never   Vaping Use    Vaping status: Never Used   Substance and Sexual Activity    Alcohol use: Yes     Comment: Rarely drink alcohol    Drug use: Never    Sexual activity: Yes     Partners: Female         Current Outpatient Medications:     atorvastatin (LIPITOR) 20 MG tablet, Take 1 tablet by mouth Daily., Disp: 90 tablet, Rfl: 3    famotidine (PEPCID) 40 MG tablet, Take 1 tablet by mouth Daily., Disp: 90 tablet, Rfl: 1    omeprazole (priLOSEC) 40 MG capsule, TAKE 1 CAPSULE BY MOUTH DAILY, Disp: 90 capsule, Rfl: 1    PARoxetine (PAXIL) 20 MG tablet, TAKE ONE TABLET BY MOUTH EVERY MORNING, Disp: 90 tablet, Rfl: 0    ramipril (ALTACE) 10 MG capsule, Take 1 capsule by mouth Daily., Disp: 90 capsule, Rfl: 1    Objective     Vital Signs  /86   Pulse 86   Ht 177.8 cm (70\")   Wt 85.7 kg (189 lb)   SpO2 98%   BMI 27.12 kg/m²   Estimated body mass index is 27.12 kg/m² as calculated from the following:    Height as of this encounter: 177.8 cm (70\").    Weight as of this encounter: 85.7 kg (189 lb).           Physical Exam  Constitutional:       Appearance: Normal appearance.   HENT:      Head: Normocephalic and atraumatic.      Nose: Nose normal.   Eyes:      Extraocular Movements: Extraocular movements intact.      Conjunctiva/sclera: Conjunctivae normal.      Pupils: Pupils are equal, round, and reactive to light.      Comments: Glasses in place   Cardiovascular:      Rate and Rhythm: Normal rate.   Pulmonary:      Effort: Pulmonary effort is normal. No respiratory distress.   Musculoskeletal:         General: Normal range of motion.      Cervical back: Normal range of motion and neck supple.   Skin:     General: Skin is warm and dry.   Neurological:      General: No focal deficit present.      Mental Status: He is " alert and oriented to person, place, and time. Mental status is at baseline.   Psychiatric:         Mood and Affect: Mood normal.         Behavior: Behavior normal.         Thought Content: Thought content normal.         Judgment: Judgment normal.          Assessment and Plan     Diagnoses and all orders for this visit:    1. Follow-up examination (Primary)    2. RAFAEL (generalized anxiety disorder)    3. Mixed hyperlipidemia    4. Primary hypertension    5. Gastroesophageal reflux disease, unspecified whether esophagitis present  -     famotidine (PEPCID) 40 MG tablet; Take 1 tablet by mouth Daily.  Dispense: 90 tablet; Refill: 1    Plan:  Blood pressure much better controlled.  Continue with ramipril 10 mg daily.  Continue with other current medications.  No medication changes today.  Refill of famotidine sent to the pharmacy on file.  Continue to work on dietary and lifestyle modifications and increasing exercise.  Continue to monitor blood pressure at home.  Follow-up in 4 months with Dr. Morris for annual physical exam.  Return to clinic sooner if needed.    Follow Up  Return for Follow up with Dr. Morris, Next scheduled follow up.    GARRY Conley    Part of this note may be an electronic transcription/translation of spoken language to printed text using the Dragon Dictation System.  Answers submitted by the patient for this visit:  Primary Reason for Visit (Submitted on 8/17/2024)  What is the primary reason for your visit?: Other  Other (Submitted on 8/17/2024)  Please describe your symptoms.: Follow up visit  Have you had these symptoms before?: Yes  How long have you been having these symptoms?: 1-4 days

## 2024-10-07 DIAGNOSIS — F41.1 GAD (GENERALIZED ANXIETY DISORDER): ICD-10-CM

## 2024-10-07 RX ORDER — PAROXETINE 20 MG/1
20 TABLET, FILM COATED ORAL EVERY MORNING
Qty: 90 TABLET | Refills: 1 | Status: SHIPPED | OUTPATIENT
Start: 2024-10-07

## 2024-10-27 DIAGNOSIS — I10 PRIMARY HYPERTENSION: ICD-10-CM

## 2024-10-28 RX ORDER — RAMIPRIL 10 MG/1
10 CAPSULE ORAL DAILY
Qty: 90 CAPSULE | Refills: 0 | Status: SHIPPED | OUTPATIENT
Start: 2024-10-28

## 2024-10-28 NOTE — TELEPHONE ENCOUNTER
Last filled:6/19/24  ramipril (ALTACE) 10 MG  Dispense Quantity: 90 capsule Refills: 1   Sig: Take 1 capsule by mouth Daily.  LOV:8/19/24  NOV:12/20/24

## 2024-12-20 ENCOUNTER — OFFICE VISIT (OUTPATIENT)
Dept: INTERNAL MEDICINE | Facility: CLINIC | Age: 34
End: 2024-12-20
Payer: COMMERCIAL

## 2024-12-20 ENCOUNTER — LAB (OUTPATIENT)
Dept: LAB | Facility: HOSPITAL | Age: 34
End: 2024-12-20
Payer: COMMERCIAL

## 2024-12-20 VITALS
SYSTOLIC BLOOD PRESSURE: 132 MMHG | WEIGHT: 187 LBS | TEMPERATURE: 98.3 F | DIASTOLIC BLOOD PRESSURE: 80 MMHG | BODY MASS INDEX: 26.77 KG/M2 | HEIGHT: 70 IN | HEART RATE: 70 BPM | RESPIRATION RATE: 14 BRPM | OXYGEN SATURATION: 98 %

## 2024-12-20 DIAGNOSIS — I10 PRIMARY HYPERTENSION: ICD-10-CM

## 2024-12-20 DIAGNOSIS — E66.3 OVERWEIGHT: ICD-10-CM

## 2024-12-20 DIAGNOSIS — K29.50 CHRONIC GASTRITIS WITHOUT BLEEDING, UNSPECIFIED GASTRITIS TYPE: ICD-10-CM

## 2024-12-20 DIAGNOSIS — L91.8 SKIN TAG: ICD-10-CM

## 2024-12-20 DIAGNOSIS — E78.2 MIXED HYPERLIPIDEMIA: ICD-10-CM

## 2024-12-20 DIAGNOSIS — R39.12 WEAK URINARY STREAM: ICD-10-CM

## 2024-12-20 DIAGNOSIS — Z00.00 ANNUAL PHYSICAL EXAM: ICD-10-CM

## 2024-12-20 DIAGNOSIS — Z00.00 ANNUAL PHYSICAL EXAM: Primary | ICD-10-CM

## 2024-12-20 DIAGNOSIS — F41.1 GAD (GENERALIZED ANXIETY DISORDER): ICD-10-CM

## 2024-12-20 DIAGNOSIS — K21.9 GASTROESOPHAGEAL REFLUX DISEASE WITHOUT ESOPHAGITIS: ICD-10-CM

## 2024-12-20 PROBLEM — E78.1 HYPERTRIGLYCERIDEMIA: Status: ACTIVE | Noted: 2024-12-20

## 2024-12-20 LAB
ALBUMIN SERPL-MCNC: 4.9 G/DL (ref 3.5–5.2)
ALBUMIN/GLOB SERPL: 1.8 G/DL
ALP SERPL-CCNC: 96 U/L (ref 39–117)
ALT SERPL W P-5'-P-CCNC: 26 U/L (ref 1–41)
ANION GAP SERPL CALCULATED.3IONS-SCNC: 14.6 MMOL/L (ref 5–15)
AST SERPL-CCNC: 22 U/L (ref 1–40)
BILIRUB SERPL-MCNC: 0.6 MG/DL (ref 0–1.2)
BUN SERPL-MCNC: 16 MG/DL (ref 6–20)
BUN/CREAT SERPL: 15 (ref 7–25)
CALCIUM SPEC-SCNC: 9.9 MG/DL (ref 8.6–10.5)
CHLORIDE SERPL-SCNC: 97 MMOL/L (ref 98–107)
CHOLEST SERPL-MCNC: 130 MG/DL (ref 0–200)
CO2 SERPL-SCNC: 27.4 MMOL/L (ref 22–29)
CREAT SERPL-MCNC: 1.07 MG/DL (ref 0.76–1.27)
DEPRECATED RDW RBC AUTO: 39.6 FL (ref 37–54)
EGFRCR SERPLBLD CKD-EPI 2021: 92.8 ML/MIN/1.73
ERYTHROCYTE [DISTWIDTH] IN BLOOD BY AUTOMATED COUNT: 12.2 % (ref 12.3–15.4)
GLOBULIN UR ELPH-MCNC: 2.8 GM/DL
GLUCOSE SERPL-MCNC: 96 MG/DL (ref 65–99)
HBA1C MFR BLD: 5.1 % (ref 4.8–5.6)
HCT VFR BLD AUTO: 44.7 % (ref 37.5–51)
HDLC SERPL-MCNC: 35 MG/DL (ref 40–60)
HGB BLD-MCNC: 14.6 G/DL (ref 13–17.7)
LDLC SERPL CALC-MCNC: 72 MG/DL (ref 0–100)
LDLC/HDLC SERPL: 2 {RATIO}
MCH RBC QN AUTO: 29 PG (ref 26.6–33)
MCHC RBC AUTO-ENTMCNC: 32.7 G/DL (ref 31.5–35.7)
MCV RBC AUTO: 88.7 FL (ref 79–97)
PLATELET # BLD AUTO: 237 10*3/MM3 (ref 140–450)
PMV BLD AUTO: 9.4 FL (ref 6–12)
POTASSIUM SERPL-SCNC: 4.2 MMOL/L (ref 3.5–5.2)
PROT SERPL-MCNC: 7.7 G/DL (ref 6–8.5)
PSA SERPL-MCNC: 0.71 NG/ML (ref 0–4)
RBC # BLD AUTO: 5.04 10*6/MM3 (ref 4.14–5.8)
SODIUM SERPL-SCNC: 139 MMOL/L (ref 136–145)
TRIGL SERPL-MCNC: 125 MG/DL (ref 0–150)
TSH SERPL DL<=0.05 MIU/L-ACNC: 1.04 UIU/ML (ref 0.27–4.2)
VLDLC SERPL-MCNC: 23 MG/DL (ref 5–40)
WBC NRBC COR # BLD AUTO: 6.58 10*3/MM3 (ref 3.4–10.8)

## 2024-12-20 PROCEDURE — 99395 PREV VISIT EST AGE 18-39: CPT | Performed by: INTERNAL MEDICINE

## 2024-12-20 PROCEDURE — 80061 LIPID PANEL: CPT

## 2024-12-20 PROCEDURE — 80050 GENERAL HEALTH PANEL: CPT

## 2024-12-20 PROCEDURE — 83036 HEMOGLOBIN GLYCOSYLATED A1C: CPT

## 2024-12-20 PROCEDURE — 84153 ASSAY OF PSA TOTAL: CPT

## 2024-12-20 RX ORDER — ATORVASTATIN CALCIUM 20 MG/1
20 TABLET, FILM COATED ORAL DAILY
Qty: 90 TABLET | Refills: 3 | Status: SHIPPED | OUTPATIENT
Start: 2024-12-20

## 2024-12-20 RX ORDER — OMEPRAZOLE 40 MG/1
40 CAPSULE, DELAYED RELEASE ORAL DAILY
Qty: 90 CAPSULE | Refills: 3 | Status: SHIPPED | OUTPATIENT
Start: 2024-12-20

## 2024-12-20 RX ORDER — FAMOTIDINE 40 MG/1
40 TABLET, FILM COATED ORAL DAILY
Qty: 90 TABLET | Refills: 3 | Status: SHIPPED | OUTPATIENT
Start: 2024-12-20

## 2024-12-20 RX ORDER — PAROXETINE 20 MG/1
20 TABLET, FILM COATED ORAL EVERY MORNING
Qty: 90 TABLET | Refills: 3 | Status: SHIPPED | OUTPATIENT
Start: 2024-12-20

## 2024-12-20 RX ORDER — RAMIPRIL 10 MG/1
10 CAPSULE ORAL DAILY
Qty: 90 CAPSULE | Refills: 3 | Status: SHIPPED | OUTPATIENT
Start: 2024-12-20

## 2024-12-20 NOTE — PROGRESS NOTES
Internal Medicine Annual Exam  Andres Corral is a 34 y.o. male who presents today for an annual exam and with concerns as outlined below.    Chief Complaint  Chief Complaint   Patient presents with    Annual Exam        HPI  Mr. Corral comes in today for his physical. He is doing well overall. Does note recent illness and loss in his wife's family. He has been less active as a result. He is working on healthy diet. He is losing weight. He does plan to increase exercise and would like to resume running. He reports blood pressure and mood both well controlled on current medications. He is interested in trying a hemp gummy to help with anxiety and stress. He is up to date with dental and vision exams. He declines COVID and flu vaccines today. Denies use of tobacco, ecigarettes, illicit drugs, and drinks very minimal alcohol. He does note occasional weak urinary stream. May occur once a week. Denies rectal pain, hematuria.           Review of Systems  Review of Systems   Constitutional:  Positive for activity change.   Eyes: Negative.    Respiratory: Negative.     Cardiovascular: Negative.    Gastrointestinal: Negative.    Genitourinary:  Positive for difficulty urinating (weak stream). Negative for decreased urine volume and hematuria.   Musculoskeletal: Negative.    Skin:  Positive for skin lesions (skin tag on R forearm).   Neurological: Negative.    Psychiatric/Behavioral:  Positive for stress. Negative for depressed mood. The patient is not nervous/anxious.         Past Medical History  Past Medical History:   Diagnosis Date    Anxiety     Gastroesophageal reflux disease 8/9/2022    GERD (gastroesophageal reflux disease)     Hyperlipidemia     Hypertension     Mixed hyperlipidemia 8/9/2022    Primary hypertension 8/9/2022        Surgical History  Past Surgical History:   Procedure Laterality Date    COLONOSCOPY  09/15/2023    Normal    WISDOM TOOTH EXTRACTION          Family History  Family History   Problem  "Relation Age of Onset    Hyperlipidemia Mother     Hypertension Father     No Known Problems Brother     No Known Problems Brother         Social History  Social History     Socioeconomic History    Marital status:    Tobacco Use    Smoking status: Never     Passive exposure: Never    Smokeless tobacco: Never   Vaping Use    Vaping status: Never Used   Substance and Sexual Activity    Alcohol use: Yes     Comment: Rarely drink alcohol    Drug use: Never    Sexual activity: Yes     Partners: Female     Birth control/protection: Condom        Current Medications  Current Outpatient Medications on File Prior to Visit   Medication Sig Dispense Refill    atorvastatin (LIPITOR) 20 MG tablet Take 1 tablet by mouth Daily. 90 tablet 3    famotidine (PEPCID) 40 MG tablet Take 1 tablet by mouth Daily. 90 tablet 1    omeprazole (priLOSEC) 40 MG capsule TAKE 1 CAPSULE BY MOUTH DAILY 90 capsule 1    PARoxetine (PAXIL) 20 MG tablet TAKE ONE TABLET BY MOUTH EVERY MORNING 90 tablet 1    ramipril (ALTACE) 10 MG capsule Take 1 capsule by mouth Daily. 90 capsule 0     No current facility-administered medications on file prior to visit.       Allergies  No Known Allergies     Objective  Visit Vitals  /80 (BP Location: Left arm, Patient Position: Sitting, Cuff Size: Adult)   Pulse 70   Temp 98.3 °F (36.8 °C) (Skin)   Resp 14   Ht 178 cm (70.08\")   Wt 84.8 kg (187 lb)   SpO2 98%   BMI 26.77 kg/m²        Physical Exam  Physical Exam  Vitals and nursing note reviewed.   Constitutional:       General: He is not in acute distress.     Appearance: Normal appearance. He is well-developed. He is not ill-appearing or toxic-appearing.   HENT:      Head: Normocephalic and atraumatic.      Right Ear: Tympanic membrane, ear canal and external ear normal.      Left Ear: Tympanic membrane, ear canal and external ear normal.   Eyes:      Conjunctiva/sclera: Conjunctivae normal.   Cardiovascular:      Rate and Rhythm: Normal rate and regular " rhythm.      Heart sounds: Normal heart sounds. No murmur heard.  Pulmonary:      Effort: Pulmonary effort is normal. No respiratory distress.      Breath sounds: Normal breath sounds.   Abdominal:      General: There is no distension.      Palpations: Abdomen is soft. There is no mass.      Tenderness: There is no abdominal tenderness.   Musculoskeletal:      Right lower leg: No edema.      Left lower leg: No edema.   Lymphadenopathy:      Cervical: No cervical adenopathy.   Skin:     General: Skin is warm and dry.      Findings: Lesion (raised flesh colored papule with flat top on R forearm) present.   Neurological:      Mental Status: He is alert and oriented to person, place, and time. Mental status is at baseline.      Gait: Gait normal.   Psychiatric:         Mood and Affect: Mood normal.         Behavior: Behavior normal.         Thought Content: Thought content normal.         Judgment: Judgment normal.          Results  Results for orders placed or performed in visit on 06/27/24   Basic Metabolic Panel    Collection Time: 06/27/24  8:43 AM    Specimen: Blood   Result Value Ref Range    Glucose 92 65 - 99 mg/dL    BUN 17 6 - 20 mg/dL    Creatinine 1.09 0.76 - 1.27 mg/dL    Sodium 138 136 - 145 mmol/L    Potassium 4.3 3.5 - 5.2 mmol/L    Chloride 100 98 - 107 mmol/L    CO2 28.0 22.0 - 29.0 mmol/L    Calcium 9.7 8.6 - 10.5 mg/dL    BUN/Creatinine Ratio 15.6 7.0 - 25.0    Anion Gap 10.0 5.0 - 15.0 mmol/L    eGFR 91.3 >60.0 mL/min/1.73   Lipid Panel    Collection Time: 06/27/24  8:43 AM    Specimen: Blood   Result Value Ref Range    Total Cholesterol 176 0 - 200 mg/dL    Triglycerides 238 (H) 0 - 150 mg/dL    HDL Cholesterol 40 40 - 60 mg/dL    LDL Cholesterol  96 0 - 100 mg/dL    VLDL Cholesterol 40 5 - 40 mg/dL    LDL/HDL Ratio 2.21         Assessment and Plan  Diagnoses and all orders for this visit:    Annual physical exam  - Counseling was given to patient for the following topics:  appropriate exercise,  healthy eating habits, disease prevention, and importance of immunizations, including risks and benefits. Also discussed the importance of regular dental and vision care, as well recommendation for a yearly screening skin exam.  -     CBC (No Diff); Future  -     TSH Rfx On Abnormal To Free T4; Future  -     Hemoglobin A1c; Future    Primary hypertension  - well controlled, continue ramipril 10mg daily  - CMP today    Chronic gastritis without bleeding, unspecified gastritis type  Gastroesophageal reflux disease without esophagitis  - chronic gastritis noted on EGD 9/2023, no bleeding  - controlled on omeprazole 40mg daily and pepcid qhs  - notes inability to stop PPI    RAFAEL (generalized anxiety disorder)  - stable on paxil 20mg daily, will continue  - discussed potential for CBD/THC gummies to cause worsened anxiety, psychosis, paranoia. Okay to try cautiously.     Mixed hyperlipidemia  - LDL at goal, triglycerides mildly elevated however do elevate >700 if not fasting. Discussed need to fast for labs.  - continue atorvastatin 20mg daily  - recheck lipids    Weak urinary stream  - occurring infrequently  - will check PSA    Overweight  - he is working on weight loss with diet and increasing exercise    Skin tag  - reassured today  - do recommend full body skin exam with dermatology as well       Health Maintenance   Topic Date Due    INFLUENZA VACCINE  07/01/2024    ANNUAL PHYSICAL  08/10/2024    COVID-19 Vaccine (3 - 2024-25 season) 09/01/2024    BMI FOLLOWUP  02/10/2025    LIPID PANEL  06/27/2025    TDAP/TD VACCINES (4 - Td or Tdap) 10/23/2027    HEPATITIS C SCREENING  Completed    Pneumococcal Vaccine 0-64  Aged Out     Health Maintenance  - Colonoscopy: Start screening at age 45.  - HCV: negative  - Immunizations: Tdap 10/2017. COVID and flu declined.  - Depression screening: negative 6/2024      Return in about 6 months (around 6/20/2025) for Follow up HTN, 1 year for annual, Labs today.

## 2025-06-27 ENCOUNTER — LAB (OUTPATIENT)
Dept: LAB | Facility: HOSPITAL | Age: 35
End: 2025-06-27
Payer: COMMERCIAL

## 2025-06-27 ENCOUNTER — OFFICE VISIT (OUTPATIENT)
Dept: INTERNAL MEDICINE | Facility: CLINIC | Age: 35
End: 2025-06-27
Payer: COMMERCIAL

## 2025-06-27 VITALS
WEIGHT: 175 LBS | DIASTOLIC BLOOD PRESSURE: 86 MMHG | HEIGHT: 70 IN | HEART RATE: 68 BPM | OXYGEN SATURATION: 100 % | TEMPERATURE: 98.6 F | SYSTOLIC BLOOD PRESSURE: 124 MMHG | BODY MASS INDEX: 25.05 KG/M2

## 2025-06-27 DIAGNOSIS — E78.2 MIXED HYPERLIPIDEMIA: ICD-10-CM

## 2025-06-27 DIAGNOSIS — I10 PRIMARY HYPERTENSION: ICD-10-CM

## 2025-06-27 DIAGNOSIS — E66.3 OVERWEIGHT: Primary | ICD-10-CM

## 2025-06-27 LAB
ANION GAP SERPL CALCULATED.3IONS-SCNC: 11.9 MMOL/L (ref 5–15)
BUN SERPL-MCNC: 15 MG/DL (ref 6–20)
BUN/CREAT SERPL: 13.2 (ref 7–25)
CALCIUM SPEC-SCNC: 9.7 MG/DL (ref 8.6–10.5)
CHLORIDE SERPL-SCNC: 100 MMOL/L (ref 98–107)
CHOLEST SERPL-MCNC: 127 MG/DL (ref 0–200)
CO2 SERPL-SCNC: 26.1 MMOL/L (ref 22–29)
CREAT SERPL-MCNC: 1.14 MG/DL (ref 0.76–1.27)
EGFRCR SERPLBLD CKD-EPI 2021: 86 ML/MIN/1.73
GLUCOSE SERPL-MCNC: 93 MG/DL (ref 65–99)
HDLC SERPL-MCNC: 39 MG/DL (ref 40–60)
LDLC SERPL CALC-MCNC: 74 MG/DL (ref 0–100)
LDLC/HDLC SERPL: 1.9 {RATIO}
POTASSIUM SERPL-SCNC: 4 MMOL/L (ref 3.5–5.2)
SODIUM SERPL-SCNC: 138 MMOL/L (ref 136–145)
TRIGL SERPL-MCNC: 70 MG/DL (ref 0–150)
VLDLC SERPL-MCNC: 14 MG/DL (ref 5–40)

## 2025-06-27 PROCEDURE — 80048 BASIC METABOLIC PNL TOTAL CA: CPT

## 2025-06-27 PROCEDURE — 80061 LIPID PANEL: CPT

## 2025-06-27 NOTE — PROGRESS NOTES
Internal Medicine Follow Up    Chief Complaint  Andres Corral is a 34 y.o. male who presents today for follow up of chronic medical conditions outlined below.    Chief Complaint   Patient presents with    Hypertension        HPI  Mr. Corral comes in today for follow up on HTN and HLD. He is doing well. He has been exercising and has made efforts to reduce sugar intake. He has lost 12+lbs since his physical in December. He is hopeful that he may be able to reduce statin dose.     Hypertension  Associated symptoms: no chest pain, no headaches and no neck pain      Annual Physical  Pertinent negative symptoms include no abdominal pain, no anorexia, no joint pain, no change in stool, no chest pain, no chills, no congestion, no cough, no diaphoresis, no fatigue, no fever, no headaches, no joint swelling, no myalgias, no nausea, no neck pain, no numbness, no rash, no sore throat, no swollen glands, no dysuria, no vertigo, no visual change, no vomiting and no weakness.        Review of Systems  Review of Systems   Constitutional:  Negative for chills, diaphoresis, fatigue and fever.   HENT:  Negative for congestion, sore throat and swollen glands.    Respiratory:  Negative for cough.    Cardiovascular:  Negative for chest pain.   Gastrointestinal:  Negative for abdominal pain, anorexia, nausea and vomiting.   Genitourinary:  Negative for dysuria.   Musculoskeletal:  Negative for joint pain, myalgias and neck pain.   Skin:  Negative for rash.   Neurological:  Negative for vertigo, weakness and numbness.        Current Medications  Current Outpatient Medications on File Prior to Visit   Medication Sig Dispense Refill    atorvastatin (LIPITOR) 20 MG tablet Take 1 tablet by mouth Daily. 90 tablet 3    famotidine (PEPCID) 40 MG tablet Take 1 tablet by mouth Daily. 90 tablet 3    omeprazole (priLOSEC) 40 MG capsule Take 1 capsule by mouth Daily. 90 capsule 3    PARoxetine (PAXIL) 20 MG tablet Take 1 tablet by mouth Every  "Morning. 90 tablet 3    ramipril (ALTACE) 10 MG capsule Take 1 capsule by mouth Daily. 90 capsule 3     No current facility-administered medications on file prior to visit.       Allergies  No Known Allergies    Objective  Visit Vitals  /86 (BP Location: Left arm, Patient Position: Sitting)   Pulse 68   Temp 98.6 °F (37 °C) (Temporal)   Ht 177.8 cm (70\")   Wt 79.4 kg (175 lb)   SpO2 100%   BMI 25.11 kg/m²        Physical Exam  Physical Exam  Vitals and nursing note reviewed.   Constitutional:       General: He is not in acute distress.     Appearance: Normal appearance. He is well-developed. He is not ill-appearing or toxic-appearing.   HENT:      Head: Normocephalic and atraumatic.   Eyes:      Conjunctiva/sclera: Conjunctivae normal.   Cardiovascular:      Rate and Rhythm: Normal rate and regular rhythm.      Heart sounds: Normal heart sounds.   Pulmonary:      Effort: Pulmonary effort is normal. No respiratory distress.      Breath sounds: Normal breath sounds.   Musculoskeletal:      Right lower leg: No edema.      Left lower leg: No edema.   Skin:     General: Skin is warm and dry.   Neurological:      Mental Status: He is alert and oriented to person, place, and time. Mental status is at baseline.      Gait: Gait normal.   Psychiatric:         Mood and Affect: Mood normal.         Behavior: Behavior normal.         Thought Content: Thought content normal.         Judgment: Judgment normal.         Results  Results for orders placed or performed in visit on 12/20/24   CBC (No Diff)    Collection Time: 12/20/24 10:01 AM    Specimen: Blood   Result Value Ref Range    WBC 6.58 3.40 - 10.80 10*3/mm3    RBC 5.04 4.14 - 5.80 10*6/mm3    Hemoglobin 14.6 13.0 - 17.7 g/dL    Hematocrit 44.7 37.5 - 51.0 %    MCV 88.7 79.0 - 97.0 fL    MCH 29.0 26.6 - 33.0 pg    MCHC 32.7 31.5 - 35.7 g/dL    RDW 12.2 (L) 12.3 - 15.4 %    RDW-SD 39.6 37.0 - 54.0 fl    MPV 9.4 6.0 - 12.0 fL    Platelets 237 140 - 450 10*3/mm3 "   Comprehensive Metabolic Panel    Collection Time: 12/20/24 10:01 AM    Specimen: Blood   Result Value Ref Range    Glucose 96 65 - 99 mg/dL    BUN 16 6 - 20 mg/dL    Creatinine 1.07 0.76 - 1.27 mg/dL    Sodium 139 136 - 145 mmol/L    Potassium 4.2 3.5 - 5.2 mmol/L    Chloride 97 (L) 98 - 107 mmol/L    CO2 27.4 22.0 - 29.0 mmol/L    Calcium 9.9 8.6 - 10.5 mg/dL    Total Protein 7.7 6.0 - 8.5 g/dL    Albumin 4.9 3.5 - 5.2 g/dL    ALT (SGPT) 26 1 - 41 U/L    AST (SGOT) 22 1 - 40 U/L    Alkaline Phosphatase 96 39 - 117 U/L    Total Bilirubin 0.6 0.0 - 1.2 mg/dL    Globulin 2.8 gm/dL    A/G Ratio 1.8 g/dL    BUN/Creatinine Ratio 15.0 7.0 - 25.0    Anion Gap 14.6 5.0 - 15.0 mmol/L    eGFR 92.8 >60.0 mL/min/1.73   TSH Rfx On Abnormal To Free T4    Collection Time: 12/20/24 10:01 AM    Specimen: Blood   Result Value Ref Range    TSH 1.040 0.270 - 4.200 uIU/mL   Lipid Panel    Collection Time: 12/20/24 10:01 AM    Specimen: Blood   Result Value Ref Range    Total Cholesterol 130 0 - 200 mg/dL    Triglycerides 125 0 - 150 mg/dL    HDL Cholesterol 35 (L) 40 - 60 mg/dL    LDL Cholesterol  72 0 - 100 mg/dL    VLDL Cholesterol 23 5 - 40 mg/dL    LDL/HDL Ratio 2.00    Hemoglobin A1c    Collection Time: 12/20/24 10:01 AM    Specimen: Blood   Result Value Ref Range    Hemoglobin A1C 5.10 4.80 - 5.60 %   PSA DIAGNOSTIC    Collection Time: 12/20/24 10:01 AM    Specimen: Blood   Result Value Ref Range    PSA 0.712 0.000 - 4.000 ng/mL        Assessment and Plan  Diagnoses and all orders for this visit:    Overweight  - losing weight with diet, exercise. BMI nearly normal.  - continue efforts at weight loss    Primary hypertension  - well controlled, continue ramipril 10mg daily  - BMP today    Mixed hyperlipidemia  - has lost weight with diet, exercise  - hopeful to reduce statin dose, continue atorvastatin 20mg daily for now  - will check lipids    Health Maintenance  - Colonoscopy: Start screening at age 44.  - HCV: negative  -  Immunizations: Tdap 10/2017.  - Depression screening: negative 6/2025    Return for Next scheduled follow up, Labs today.

## 2025-07-22 ENCOUNTER — TELEPHONE (OUTPATIENT)
Dept: INTERNAL MEDICINE | Facility: CLINIC | Age: 35
End: 2025-07-22
Payer: COMMERCIAL

## 2025-07-22 NOTE — TELEPHONE ENCOUNTER
Patient called into the office regarding his medication. He stated he is feeling a bit weird while taking his Paroxetine 20 MG Tablet. Feeling tired, and not normal. He wanted to leave a message for Dr. Morris to give him a call tomorrow. He wasn't sure if he needed to go down in dosage and just wants clarification but was not in a rush. Stated the message was fine to leave.     Call back: [phone]

## 2025-07-23 NOTE — TELEPHONE ENCOUNTER
I cannot be sure that the paroxetine is to blame for his symptoms since the dose has been stable. Is he checking blood pressure? It is possible his BP is running lower since he has been losing weight. Is he having any other symptoms such as changes in bowel habits, blood in stool, chest pain, SOA, palpitations, fever, night sweats, nausea and vomiting, dizziness?

## 2025-07-23 NOTE — TELEPHONE ENCOUNTER
Notified patient and he verbalized understanding. He will give us a call back to schedule an appointment if he does not see any improvements.

## 2025-07-23 NOTE — TELEPHONE ENCOUNTER
He can try cutting the tablets in half and taking half tab daily. If not improving I would like to see him.

## 2025-07-23 NOTE — TELEPHONE ENCOUNTER
Called and spoke to pt. Reports his BP has been averaging around 130/70. Denies any changes in bowel movements, SOA, N/V, CP, Fever, chills. Reports he has been having sxs of lightheadedness and brain fogginess since starting this refill that he picked up 7/22/25. Reports it feels similar to when he has missed a dose in the past and woke up feeling groggy and lightheaded. Reports when that happened sxs would resolve hours after taking his medication, but now he is having them constantly with this rx. Reports contacting pharmacy and they informed him that the manufacture did change, and he likely needs to change the dosage of his medication.